# Patient Record
Sex: MALE | URBAN - METROPOLITAN AREA
[De-identification: names, ages, dates, MRNs, and addresses within clinical notes are randomized per-mention and may not be internally consistent; named-entity substitution may affect disease eponyms.]

---

## 2018-01-17 ENCOUNTER — HOSPITAL ENCOUNTER (OUTPATIENT)
Dept: ORTHOPEDIC SURGERY | Age: 73
Discharge: HOME | End: 2018-01-17
Payer: MEDICARE

## 2018-01-17 DIAGNOSIS — M25.551 PAIN IN JOINT INVOLVING RIGHT PELVIC REGION AND THIGH: ICD-10-CM

## 2018-01-17 PROCEDURE — 73502 X-RAY EXAM HIP UNI 2-3 VIEWS: CPT

## 2023-05-24 LAB
ALANINE AMINOTRANSFERASE (SGPT) (U/L) IN SER/PLAS: 14 U/L (ref 10–52)
ALBUMIN (G/DL) IN SER/PLAS: 4.3 G/DL (ref 3.4–5)
ALKALINE PHOSPHATASE (U/L) IN SER/PLAS: 43 U/L (ref 33–136)
ANION GAP IN SER/PLAS: 10 MMOL/L (ref 10–20)
ASPARTATE AMINOTRANSFERASE (SGOT) (U/L) IN SER/PLAS: 16 U/L (ref 9–39)
BILIRUBIN TOTAL (MG/DL) IN SER/PLAS: 0.8 MG/DL (ref 0–1.2)
CALCIUM (MG/DL) IN SER/PLAS: 9 MG/DL (ref 8.6–10.3)
CARBON DIOXIDE, TOTAL (MMOL/L) IN SER/PLAS: 25 MMOL/L (ref 21–32)
CHLORIDE (MMOL/L) IN SER/PLAS: 106 MMOL/L (ref 98–107)
CREATININE (MG/DL) IN SER/PLAS: 0.9 MG/DL (ref 0.5–1.3)
ESTIMATED AVERAGE GLUCOSE FOR HBA1C: 166 MG/DL
GFR MALE: 88 ML/MIN/1.73M2
GLUCOSE (MG/DL) IN SER/PLAS: 104 MG/DL (ref 74–99)
HEMOGLOBIN A1C/HEMOGLOBIN TOTAL IN BLOOD: 7.4 %
POTASSIUM (MMOL/L) IN SER/PLAS: 4.4 MMOL/L (ref 3.5–5.3)
PROSTATE SPECIFIC AG (NG/ML) IN SER/PLAS: 1.79 NG/ML (ref 0–4)
PROTEIN TOTAL: 7.5 G/DL (ref 6.4–8.2)
SODIUM (MMOL/L) IN SER/PLAS: 137 MMOL/L (ref 136–145)
UREA NITROGEN (MG/DL) IN SER/PLAS: 19 MG/DL (ref 6–23)

## 2023-08-23 PROBLEM — R39.9 LOWER URINARY TRACT SYMPTOMS (LUTS): Status: ACTIVE | Noted: 2023-08-23

## 2023-08-23 PROBLEM — R07.89 ATYPICAL CHEST PAIN: Status: ACTIVE | Noted: 2023-08-23

## 2023-08-23 PROBLEM — Z95.0 CARDIAC PACEMAKER IN SITU: Status: ACTIVE | Noted: 2023-08-23

## 2023-08-23 PROBLEM — M65.30 TRIGGER FINGER: Status: ACTIVE | Noted: 2023-08-23

## 2023-08-23 PROBLEM — M19.049 OSTEOARTHRITIS, HAND: Status: ACTIVE | Noted: 2023-08-23

## 2023-08-23 PROBLEM — M25.50 ARTHRALGIA: Status: ACTIVE | Noted: 2023-08-23

## 2023-08-23 PROBLEM — I21.4 NON-ST ELEVATED MYOCARDIAL INFARCTION (MULTI): Status: ACTIVE | Noted: 2023-08-23

## 2023-08-23 PROBLEM — I25.10 CAD S/P PERCUTANEOUS CORONARY ANGIOPLASTY: Status: ACTIVE | Noted: 2023-08-23

## 2023-08-23 PROBLEM — I10 BENIGN ESSENTIAL HYPERTENSION: Status: ACTIVE | Noted: 2023-08-23

## 2023-08-23 PROBLEM — F32.A DEPRESSION: Status: ACTIVE | Noted: 2023-08-23

## 2023-08-23 PROBLEM — M89.9 LESION OF VERTEBRA: Status: ACTIVE | Noted: 2023-08-23

## 2023-08-23 PROBLEM — I87.309 CHRONIC PERIPHERAL VENOUS HYPERTENSION: Status: ACTIVE | Noted: 2023-08-23

## 2023-08-23 PROBLEM — R91.1 PULMONARY NODULE: Status: ACTIVE | Noted: 2023-08-23

## 2023-08-23 PROBLEM — M25.519 SHOULDER PAIN: Status: ACTIVE | Noted: 2023-08-23

## 2023-08-23 PROBLEM — M19.90 DJD (DEGENERATIVE JOINT DISEASE): Status: ACTIVE | Noted: 2023-08-23

## 2023-08-23 PROBLEM — E78.2 HYPERLIPIDEMIA, MIXED: Status: ACTIVE | Noted: 2023-08-23

## 2023-08-23 PROBLEM — Z98.61 CAD S/P PERCUTANEOUS CORONARY ANGIOPLASTY: Status: ACTIVE | Noted: 2023-08-23

## 2023-08-23 PROBLEM — M75.111 NONTRAUMATIC INCOMPLETE TEAR OF RIGHT ROTATOR CUFF: Status: ACTIVE | Noted: 2023-08-23

## 2023-08-23 PROBLEM — N47.1 PHIMOSIS: Status: ACTIVE | Noted: 2023-08-23

## 2023-08-23 PROBLEM — I48.91 FIBRILLATION, ATRIAL (MULTI): Status: ACTIVE | Noted: 2023-08-23

## 2023-08-23 PROBLEM — M54.16 LUMBAR RADICULOPATHY, CHRONIC: Status: ACTIVE | Noted: 2023-08-23

## 2023-08-23 PROBLEM — R97.20 RISING PSA LEVEL: Status: ACTIVE | Noted: 2023-08-23

## 2023-08-23 PROBLEM — H93.13 TINNITUS OF BOTH EARS: Status: ACTIVE | Noted: 2023-08-23

## 2023-08-23 PROBLEM — I25.5 ISCHEMIC CARDIOMYOPATHY: Status: ACTIVE | Noted: 2023-08-23

## 2023-08-23 PROBLEM — F41.9 ANXIETY: Status: ACTIVE | Noted: 2023-08-23

## 2023-08-23 RX ORDER — EMPAGLIFLOZIN 10 MG/1
1 TABLET, FILM COATED ORAL DAILY
COMMUNITY
Start: 2022-01-03 | End: 2023-10-03 | Stop reason: ALTCHOICE

## 2023-08-23 RX ORDER — LISINOPRIL 10 MG/1
1 TABLET ORAL 2 TIMES DAILY
COMMUNITY
Start: 2021-10-25 | End: 2023-10-12

## 2023-08-23 RX ORDER — EVOLOCUMAB 140 MG/ML
140 INJECTION, SOLUTION SUBCUTANEOUS
COMMUNITY
Start: 2022-01-26 | End: 2023-11-28 | Stop reason: SDUPTHER

## 2023-08-23 RX ORDER — ISOSORBIDE MONONITRATE 60 MG/1
1 TABLET, EXTENDED RELEASE ORAL DAILY
COMMUNITY
Start: 2021-05-09

## 2023-08-23 RX ORDER — CLOPIDOGREL BISULFATE 75 MG/1
1 TABLET ORAL DAILY
COMMUNITY
End: 2023-12-29

## 2023-08-23 RX ORDER — EMPAGLIFLOZIN 25 MG/1
1 TABLET, FILM COATED ORAL DAILY
COMMUNITY
Start: 2023-02-01

## 2023-08-23 RX ORDER — ACETAMINOPHEN 500 MG
1 TABLET ORAL DAILY
COMMUNITY

## 2023-08-23 RX ORDER — NITROGLYCERIN 0.4 MG/1
TABLET SUBLINGUAL EVERY 5 MIN PRN
COMMUNITY
End: 2023-12-18 | Stop reason: SDUPTHER

## 2023-08-23 RX ORDER — ASPIRIN 81 MG/1
2 TABLET ORAL DAILY
COMMUNITY

## 2023-08-23 RX ORDER — BLOOD SUGAR DIAGNOSTIC
1 STRIP MISCELLANEOUS DAILY
COMMUNITY
End: 2024-02-07 | Stop reason: SDUPTHER

## 2023-08-23 RX ORDER — AMOXICILLIN 875 MG/1
TABLET, FILM COATED ORAL
COMMUNITY
Start: 2023-01-10 | End: 2023-10-03 | Stop reason: ALTCHOICE

## 2023-08-23 RX ORDER — METOPROLOL TARTRATE 25 MG/1
1 TABLET, FILM COATED ORAL 2 TIMES DAILY
COMMUNITY
Start: 2021-12-07 | End: 2023-11-09

## 2023-09-28 LAB
ALANINE AMINOTRANSFERASE (SGPT) (U/L) IN SER/PLAS: 14 U/L (ref 10–52)
ALBUMIN (G/DL) IN SER/PLAS: 4.3 G/DL (ref 3.4–5)
ALKALINE PHOSPHATASE (U/L) IN SER/PLAS: 45 U/L (ref 33–136)
ANION GAP IN SER/PLAS: 11 MMOL/L (ref 10–20)
ASPARTATE AMINOTRANSFERASE (SGOT) (U/L) IN SER/PLAS: 17 U/L (ref 9–39)
BILIRUBIN TOTAL (MG/DL) IN SER/PLAS: 0.8 MG/DL (ref 0–1.2)
CALCIUM (MG/DL) IN SER/PLAS: 9.7 MG/DL (ref 8.6–10.3)
CARBON DIOXIDE, TOTAL (MMOL/L) IN SER/PLAS: 27 MMOL/L (ref 21–32)
CHLORIDE (MMOL/L) IN SER/PLAS: 104 MMOL/L (ref 98–107)
CREATININE (MG/DL) IN SER/PLAS: 0.95 MG/DL (ref 0.5–1.3)
ERYTHROCYTE DISTRIBUTION WIDTH (RATIO) BY AUTOMATED COUNT: 13.1 % (ref 11.5–14.5)
ERYTHROCYTE MEAN CORPUSCULAR HEMOGLOBIN CONCENTRATION (G/DL) BY AUTOMATED: 32.8 G/DL (ref 32–36)
ERYTHROCYTE MEAN CORPUSCULAR VOLUME (FL) BY AUTOMATED COUNT: 94 FL (ref 80–100)
ERYTHROCYTES (10*6/UL) IN BLOOD BY AUTOMATED COUNT: 5.08 X10E12/L (ref 4.5–5.9)
ESTIMATED AVERAGE GLUCOSE FOR HBA1C: 157 MG/DL
GFR MALE: 82 ML/MIN/1.73M2
GLUCOSE (MG/DL) IN SER/PLAS: 96 MG/DL (ref 74–99)
HEMATOCRIT (%) IN BLOOD BY AUTOMATED COUNT: 47.6 % (ref 41–52)
HEMOGLOBIN (G/DL) IN BLOOD: 15.6 G/DL (ref 13.5–17.5)
HEMOGLOBIN A1C/HEMOGLOBIN TOTAL IN BLOOD: 7.1 %
LEUKOCYTES (10*3/UL) IN BLOOD BY AUTOMATED COUNT: 6.2 X10E9/L (ref 4.4–11.3)
PLATELETS (10*3/UL) IN BLOOD AUTOMATED COUNT: 216 X10E9/L (ref 150–450)
POTASSIUM (MMOL/L) IN SER/PLAS: 4.7 MMOL/L (ref 3.5–5.3)
PROTEIN TOTAL: 7.6 G/DL (ref 6.4–8.2)
SODIUM (MMOL/L) IN SER/PLAS: 137 MMOL/L (ref 136–145)
UREA NITROGEN (MG/DL) IN SER/PLAS: 22 MG/DL (ref 6–23)

## 2023-10-03 ENCOUNTER — OFFICE VISIT (OUTPATIENT)
Dept: PRIMARY CARE | Facility: CLINIC | Age: 78
End: 2023-10-03
Payer: MEDICARE

## 2023-10-03 VITALS
TEMPERATURE: 97.3 F | BODY MASS INDEX: 33.24 KG/M2 | HEART RATE: 65 BPM | HEIGHT: 70 IN | DIASTOLIC BLOOD PRESSURE: 57 MMHG | WEIGHT: 232.2 LBS | SYSTOLIC BLOOD PRESSURE: 94 MMHG

## 2023-10-03 DIAGNOSIS — E13.9 DIABETES 1.5, MANAGED AS TYPE 2 (MULTI): Primary | ICD-10-CM

## 2023-10-03 DIAGNOSIS — I25.10 CAD S/P PERCUTANEOUS CORONARY ANGIOPLASTY: ICD-10-CM

## 2023-10-03 DIAGNOSIS — Z98.61 CAD S/P PERCUTANEOUS CORONARY ANGIOPLASTY: ICD-10-CM

## 2023-10-03 DIAGNOSIS — E78.2 HYPERLIPIDEMIA, MIXED: ICD-10-CM

## 2023-10-03 PROCEDURE — 1159F MED LIST DOCD IN RCRD: CPT | Performed by: FAMILY MEDICINE

## 2023-10-03 PROCEDURE — 3078F DIAST BP <80 MM HG: CPT | Performed by: FAMILY MEDICINE

## 2023-10-03 PROCEDURE — 3074F SYST BP LT 130 MM HG: CPT | Performed by: FAMILY MEDICINE

## 2023-10-03 PROCEDURE — 1036F TOBACCO NON-USER: CPT | Performed by: FAMILY MEDICINE

## 2023-10-03 PROCEDURE — 99213 OFFICE O/P EST LOW 20 MIN: CPT | Performed by: FAMILY MEDICINE

## 2023-10-03 RX ORDER — LANCETS 30 GAUGE
EACH MISCELLANEOUS
COMMUNITY
Start: 2023-01-30

## 2023-10-03 ASSESSMENT — PATIENT HEALTH QUESTIONNAIRE - PHQ9
SUM OF ALL RESPONSES TO PHQ9 QUESTIONS 1 AND 2: 2
2. FEELING DOWN, DEPRESSED OR HOPELESS: SEVERAL DAYS
1. LITTLE INTEREST OR PLEASURE IN DOING THINGS: SEVERAL DAYS

## 2023-10-05 ASSESSMENT — ENCOUNTER SYMPTOMS
CONSTITUTIONAL NEGATIVE: 1
PSYCHIATRIC NEGATIVE: 1
RESPIRATORY NEGATIVE: 1
GASTROINTESTINAL NEGATIVE: 1
CARDIOVASCULAR NEGATIVE: 1
MUSCULOSKELETAL NEGATIVE: 1
ALLERGIC/IMMUNOLOGIC NEGATIVE: 1
HEMATOLOGIC/LYMPHATIC NEGATIVE: 1
ENDOCRINE NEGATIVE: 1
EYES NEGATIVE: 1

## 2023-10-05 NOTE — PROGRESS NOTES
Subjective the patient is here today for a follow-up on his diabetes and hypertension.  He states that he is overall feeling well.  He continues on his meds noted.  His blood sugars in the morning are ranging between 100-110.  He has no other complaints at the present time.  He does have chronic tinn[facility] that [facility]  He sees his cardiologist on a regular basis.  Past medical and social histories noted    Patient ID: Juan Loza is a 78 y.o. male who presents for Follow-up (Routine 4 month follow up:  wants flu vac):    Problem List Items Addressed This Visit    None     Past Medical History:   Diagnosis Date    Encounter for immunization 11/24/2021    Encounter for immunization    Ingrowing nail 03/03/2022    Ingrown toenail    Obesity, unspecified 05/02/2022    Class 2 obesity with body mass index (BMI) of 36.0 to 36.9 in adult    Obesity, unspecified 10/17/2022    Class 2 obesity with body mass index (BMI) of 37.0 to 37.9 in adult    Obesity, unspecified 07/27/2022    Class 2 obesity with body mass index (BMI) of 35.0 to 35.9 in adult    Obesity, unspecified 04/12/2022    Class 2 obesity with body mass index (BMI) of 35.0 to 35.9 in adult    Other acute postprocedural pain 05/09/2022    Post-op pain    Other conditions influencing health status 11/14/2020    Postoperative bleeding from mouth    Personal history of other diseases of the digestive system 11/14/2020    History of oral hemorrhage    Personal history of other diseases of the musculoskeletal system and connective tissue 06/15/2022    History of neck pain    Personal history of other specified conditions 05/02/2022    History of fatigue    Personal history of other specified conditions 05/02/2022    History of abdominal pain    Stiffness of right hand, not elsewhere classified 07/06/2022    Stiffness of finger joint of right hand      Past Surgical History:   Procedure Laterality Date    OTHER SURGICAL HISTORY   11/24/2021    Hip replacement    OTHER SURGICAL HISTORY  11/24/2021    Wrist surgery    OTHER SURGICAL HISTORY  11/24/2021    Percutaneous transluminal coronary angioplasty    OTHER SURGICAL HISTORY  11/24/2021    Pacemaker insertion    OTHER SURGICAL HISTORY  11/24/2021    Appendectomy    OTHER SURGICAL HISTORY  11/24/2021    Back surgery    OTHER SURGICAL HISTORY  11/24/2021    Esophagogastroduodenoscopy    OTHER SURGICAL HISTORY  11/24/2021    Tonsillectomy    OTHER SURGICAL HISTORY  11/24/2021    Varicose vein sclerotherapy    OTHER SURGICAL HISTORY  12/09/2021    Complete colonoscopy    OTHER SURGICAL HISTORY  12/15/2021    Cataract surgery    OTHER SURGICAL HISTORY  06/29/2022    Circumcision      Family History   Problem Relation Name Age of Onset    Arthritis Mother      Hypertension Father      Stroke Father      Hypertension Brother      Esophageal cancer Son        Social History     Socioeconomic History    Marital status:      Spouse name: Not on file    Number of children: Not on file    Years of education: Not on file    Highest education level: Not on file   Occupational History    Not on file   Tobacco Use    Smoking status: Never    Smokeless tobacco: Never   Substance and Sexual Activity    Alcohol use: Not Currently    Drug use: Never    Sexual activity: Not on file   Other Topics Concern    Not on file   Social History Narrative    Not on file     Social Determinants of Health     Financial Resource Strain: Not on file   Food Insecurity: Not on file   Transportation Needs: Not on file   Physical Activity: Not on file   Stress: Not on file   Social Connections: Not on file   Intimate Partner Violence: Not on file   Housing Stability: Not on file      Metformin, Statins-hmg-coa reductase inhibitors, and Glimepiride   Current Outpatient Medications   Medication Sig Dispense Refill    OneTouch Ultra2 Meter misc TEST ONCE A DAY      aspirin 81 mg EC tablet Take 2 tablets (162 mg) by mouth once  daily.      cholecalciferol (Vitamin D3) 50 mcg (2,000 unit) capsule Take 1 capsule (50 mcg) by mouth once daily.      clopidogrel (Plavix) 75 mg tablet Take 1 tablet (75 mg) by mouth once daily.      isosorbide mononitrate ER (Imdur) 60 mg 24 hr tablet Take 1 tablet (60 mg) by mouth once daily.      Jardiance 25 mg       lisinopril 10 mg tablet Take 1 tablet (10 mg) by mouth 2 times a day.      metoprolol tartrate (Lopressor) 25 mg tablet Take 1 tablet (25 mg) by mouth 2 times a day.      multivit-min/ferrous fumarate (MULTI VITAMIN ORAL) Take 1 tablet by mouth once daily.      nitroglycerin (Nitrostat) 0.4 mg SL tablet Place under the tongue every 5 minutes if needed for chest pain (FOR UP TO 3 DOSES AS NEEDED FOR CHEST PAIN.).      OneTouch Ultra Test strip 1 strip once daily.      Repatha SureClick 140 mg/mL injection Inject 1 mL (140 mg) under the skin every 14 (fourteen) days.       No current facility-administered medications for this visit.       Immunization History   Administered Date(s) Administered    Influenza, High Dose Seasonal, Preservative Free 10/12/2015, 10/24/2017, 11/16/2018, 10/01/2019, 09/16/2020, 10/19/2021, 09/28/2022    Influenza, Unspecified 10/01/2014, 11/01/2015, 09/16/2016    Influenza, seasonal, injectable 09/23/2014    Influenza, seasonal, injectable, preservative free 11/26/2012    Moderna SARS-CoV-2 Vaccination 02/17/2021, 03/17/2021, 10/25/2021    Pneumococcal conjugate vaccine, 13-valent (PREVNAR 13) 11/30/2015, 10/28/2016    Pneumococcal polysaccharide vaccine, 23-valent, age 2 years and older (PNEUMOVAX 23) 11/17/2015    Zoster, Unspecified 08/31/2020, 11/16/2020    Zoster, live 01/01/2013        Review of Systems   Constitutional: Negative.    HENT: Negative.     Eyes: Negative.    Respiratory: Negative.     Cardiovascular: Negative.    Gastrointestinal: Negative.    Endocrine: Negative.    Genitourinary: Negative.    Musculoskeletal: Negative.    Skin: Negative.     Allergic/Immunologic: Negative.    Hematological: Negative.    Psychiatric/Behavioral: Negative.     All other systems reviewed and are negative.       Vitals:    10/03/23 1140   BP: 94/57   Pulse: 65   Temp: 36.3 °C (97.3 °F)       Physical Exam  Constitutional:       Appearance: Normal appearance.   HENT:      Right Ear: Tympanic membrane and ear canal normal.      Left Ear: Tympanic membrane and ear canal normal.   Neck:      Vascular: No carotid bruit.   Cardiovascular:      Rate and Rhythm: Normal rate and regular rhythm.      Pulses: Normal pulses.      Heart sounds: Normal heart sounds.   Pulmonary:      Effort: Pulmonary effort is normal.      Breath sounds: Normal breath sounds.   Musculoskeletal:      Cervical back: Neck supple.   Neurological:      Mental Status: He is alert.   Psychiatric:         Mood and Affect: Mood normal.         Thought Content: Thought content normal.          ASSESSMENT/PLAN:  Diabetes type 2 improved with A1c 7.1  Hyperlipidemia  Hypertension stable  Coronary artery disease followed by cardiology    Continue current medications noted  Continue to follow diabetic diet and exercise regularly  Follow-up with cardiology as scheduled  Eye examinations up-to-date  Colonoscopy up-to-date  Check CMP A1c and lipid profile in 4 months  Follow-up in 4 months and call as needed

## 2023-10-10 DIAGNOSIS — I10 BENIGN ESSENTIAL HYPERTENSION: ICD-10-CM

## 2023-10-12 RX ORDER — LISINOPRIL 10 MG/1
10 TABLET ORAL 2 TIMES DAILY
Qty: 180 TABLET | Refills: 0 | Status: SHIPPED | OUTPATIENT
Start: 2023-10-12 | End: 2024-01-17

## 2023-10-23 ENCOUNTER — HOSPITAL ENCOUNTER (OUTPATIENT)
Dept: CARDIOLOGY | Facility: HOSPITAL | Age: 78
Discharge: HOME | End: 2023-10-23
Payer: MEDICARE

## 2023-10-23 DIAGNOSIS — Z95.0 PACEMAKER: Primary | ICD-10-CM

## 2023-10-23 DIAGNOSIS — I49.5 SICK SINUS SYNDROME (MULTI): ICD-10-CM

## 2023-10-23 DIAGNOSIS — Z95.0 PACEMAKER: ICD-10-CM

## 2023-10-23 PROCEDURE — 93294 REM INTERROG EVL PM/LDLS PM: CPT | Performed by: INTERNAL MEDICINE

## 2023-10-23 PROCEDURE — 93296 REM INTERROG EVL PM/IDS: CPT

## 2023-11-07 DIAGNOSIS — I10 BENIGN ESSENTIAL HYPERTENSION: ICD-10-CM

## 2023-11-09 RX ORDER — METOPROLOL TARTRATE 25 MG/1
25 TABLET, FILM COATED ORAL 2 TIMES DAILY
Qty: 180 TABLET | Refills: 3 | Status: SHIPPED | OUTPATIENT
Start: 2023-11-09

## 2023-11-28 ENCOUNTER — TELEPHONE (OUTPATIENT)
Dept: CARDIOLOGY | Facility: CLINIC | Age: 78
End: 2023-11-28
Payer: MEDICARE

## 2023-11-28 DIAGNOSIS — E78.2 HYPERLIPIDEMIA, MIXED: ICD-10-CM

## 2023-11-28 RX ORDER — EVOLOCUMAB 140 MG/ML
140 INJECTION, SOLUTION SUBCUTANEOUS
Qty: 12 EACH | Refills: 2 | Status: SHIPPED | OUTPATIENT
Start: 2023-11-28 | End: 2024-01-25

## 2023-11-30 ENCOUNTER — TELEPHONE (OUTPATIENT)
Dept: CARDIOLOGY | Facility: CLINIC | Age: 78
End: 2023-11-30
Payer: MEDICARE

## 2023-11-30 NOTE — TELEPHONE ENCOUNTER
Pt called office stating that he is concerned about his Repatha.  He states that now that Samreen Huynh has retired who is going to take care of him getting the cailin through [facility] to get his Repatha.  Per pt he can not afford to pay $500/month. Per pt Ray use to take care of this for him.  He is asking that someone try to get him the cailin so he can get his medications.  Pt states he has a couple of weeks before he needs his medication.   Per pt he will be seeing Dr. Neil on 12/18/23 and will have a long discussion with him as he is concerned about what if Dr. Neil retires.     Routed to Manisha LE RN

## 2023-12-01 NOTE — TELEPHONE ENCOUNTER
[facility] number and Patient Advocate Number. He will try to get Repatha help with cost. Patient noted to Dr Neil that he los his wife in the summer and if cost is too much he will not take.

## 2023-12-18 ENCOUNTER — OFFICE VISIT (OUTPATIENT)
Dept: CARDIOLOGY | Facility: CLINIC | Age: 78
End: 2023-12-18
Payer: MEDICARE

## 2023-12-18 VITALS
HEIGHT: 70 IN | SYSTOLIC BLOOD PRESSURE: 124 MMHG | HEART RATE: 68 BPM | WEIGHT: 235 LBS | BODY MASS INDEX: 33.64 KG/M2 | DIASTOLIC BLOOD PRESSURE: 78 MMHG

## 2023-12-18 DIAGNOSIS — I87.309 CHRONIC PERIPHERAL VENOUS HYPERTENSION: ICD-10-CM

## 2023-12-18 DIAGNOSIS — I21.4 NON-ST ELEVATED MYOCARDIAL INFARCTION (MULTI): ICD-10-CM

## 2023-12-18 DIAGNOSIS — I10 BENIGN ESSENTIAL HYPERTENSION: ICD-10-CM

## 2023-12-18 DIAGNOSIS — E78.2 HYPERLIPIDEMIA, MIXED: ICD-10-CM

## 2023-12-18 DIAGNOSIS — Z78.9 NEVER SMOKED CIGARETTES: ICD-10-CM

## 2023-12-18 DIAGNOSIS — I25.10 CAD S/P PERCUTANEOUS CORONARY ANGIOPLASTY: ICD-10-CM

## 2023-12-18 DIAGNOSIS — I25.5 ISCHEMIC CARDIOMYOPATHY: ICD-10-CM

## 2023-12-18 DIAGNOSIS — Z95.0 CARDIAC PACEMAKER IN SITU: ICD-10-CM

## 2023-12-18 DIAGNOSIS — I48.91 ATRIAL FIBRILLATION, UNSPECIFIED TYPE (MULTI): ICD-10-CM

## 2023-12-18 DIAGNOSIS — Z98.61 CAD S/P PERCUTANEOUS CORONARY ANGIOPLASTY: ICD-10-CM

## 2023-12-18 PROCEDURE — 1036F TOBACCO NON-USER: CPT | Performed by: INTERNAL MEDICINE

## 2023-12-18 PROCEDURE — 99214 OFFICE O/P EST MOD 30 MIN: CPT | Performed by: INTERNAL MEDICINE

## 2023-12-18 PROCEDURE — 1159F MED LIST DOCD IN RCRD: CPT | Performed by: INTERNAL MEDICINE

## 2023-12-18 PROCEDURE — 3074F SYST BP LT 130 MM HG: CPT | Performed by: INTERNAL MEDICINE

## 2023-12-18 PROCEDURE — 3078F DIAST BP <80 MM HG: CPT | Performed by: INTERNAL MEDICINE

## 2023-12-18 RX ORDER — NITROGLYCERIN 0.4 MG/1
0.4 TABLET SUBLINGUAL EVERY 5 MIN PRN
Qty: 25 TABLET | Refills: 5 | Status: SHIPPED | OUTPATIENT
Start: 2023-12-18

## 2023-12-18 NOTE — PROGRESS NOTES
Patient:  Juan Loza  YOB: 1945  MRN: [id number]       HPI:       Juan Loza is a 78 y.o. male who returns today for cardiac follow-up.  Has coronary heart disease.  He unfortunately lost his wife this past summer when she passed away in his sleep unexpectedly.  He is still grieving.  He denies any new unusual cardiac symptoms.  We had a lengthy conversation pertaining to this.      Objective:     Vitals:    12/18/23 1226   BP: 124/78   Pulse: 68       Wt Readings from Last 4 Encounters:   12/18/23 107 kg (235 lb)   10/03/23 105 kg (232 lb 3.2 oz)   05/31/23 112 kg (246 lb)   04/17/23 114 kg (251 lb)       Allergies:     Allergies   Allergen Reactions    Metformin Other    Statins-Hmg-Coa Reductase Inhibitors Other    Glimepiride Other, Diarrhea and Palpitations        Medications:     Current Outpatient Medications   Medication Instructions    aspirin 81 mg EC tablet 2 tablets, oral, Daily    cholecalciferol (Vitamin D3) 50 mcg (2,000 unit) capsule 1 capsule, oral, Daily    clopidogrel (Plavix) 75 mg tablet 1 tablet, oral, Daily    isosorbide mononitrate ER (Imdur) 60 mg 24 hr tablet 1 tablet, oral, Daily    Jardiance 25 mg     lisinopril 10 mg, oral, 2 times daily    metoprolol tartrate (LOPRESSOR) 25 mg, oral, 2 times daily    multivit-min/ferrous fumarate (MULTI VITAMIN ORAL) 1 tablet, oral, Daily    nitroglycerin (Nitrostat) 0.4 mg SL tablet sublingual, Every 5 min PRN    OneTouch Ultra Test strip 1 strip, Daily    OneTouch Ultra2 Meter misc TEST ONCE A DAY    Repatha SureClick 140 mg, subcutaneous, Every 14 days       Physical Examination:     Constitutional:       Appearance: Healthy appearance. Not in distress.   Neck:      Vascular: No JVR. JVD normal.   Pulmonary:      Effort: Pulmonary effort is normal.      Breath sounds: Normal breath sounds. No wheezing. No rhonchi. No rales.   Chest:      Chest wall: Not tender to palpatation.   Cardiovascular:      PMI at left midclavicular line.  "Normal rate. Regular rhythm. Normal S1. Normal S2.       Murmurs: There is no murmur.      No gallop.  No click. No rub.   Pulses:     Intact distal pulses.   Edema:     Peripheral edema absent.   Abdominal:      General: Bowel sounds are normal.      Palpations: Abdomen is soft.      Tenderness: There is no abdominal tenderness.   Musculoskeletal: Normal range of motion.         General: No tenderness. Skin:     General: Skin is warm and dry.   Neurological:      General: No focal deficit present.      Mental Status: Alert and oriented to person, place and time.          Lab:     CBC:   Lab Results   Component Value Date    WBC 6.2 09/28/2023    RBC 5.08 09/28/2023    HGB 15.6 09/28/2023    HCT 47.6 09/28/2023     09/28/2023        CMP:    Lab Results   Component Value Date     09/28/2023    K 4.7 09/28/2023     09/28/2023    CO2 27 09/28/2023    BUN 22 09/28/2023    CREATININE 0.95 09/28/2023    GLUCOSE 96 09/28/2023    CALCIUM 9.7 09/28/2023       Magnesium:    No results found for: \"MG\"    Lipid Profile:    Lab Results   Component Value Date    TRIG 237 (H) 01/25/2023    HDL 47.8 01/25/2023       TSH:    Lab Results   Component Value Date    TSH 0.45 05/02/2022       BNP:   No results found for: \"BNP\"     PT/INR:    No results found for: \"PROTIME\", \"INR\"    HgBA1c:    Lab Results   Component Value Date    HGBA1C 7.1 (A) 09/28/2023       BMP:  Lab Results   Component Value Date     09/28/2023     05/24/2023     01/25/2023    K 4.7 09/28/2023    K 4.4 05/24/2023    K 4.5 01/25/2023     09/28/2023     05/24/2023     01/25/2023    CO2 27 09/28/2023    CO2 25 05/24/2023    CO2 25 01/25/2023    BUN 22 09/28/2023    BUN 19 05/24/2023    BUN 19 01/25/2023    CREATININE 0.95 09/28/2023    CREATININE 0.90 05/24/2023    CREATININE 0.87 01/25/2023       CBC:  Lab Results   Component Value Date    WBC 6.2 09/28/2023    WBC 6.2 05/02/2022    WBC 7.0 04/07/2022    RBC 5.08 " "09/28/2023    RBC 5.09 05/02/2022    RBC 5.27 04/07/2022    HGB 15.6 09/28/2023    HGB 15.7 05/02/2022    HGB 16.2 04/07/2022    HCT 47.6 09/28/2023    HCT 49.5 05/02/2022    HCT 50.5 04/07/2022    MCV 94 09/28/2023    MCV 97 05/02/2022    MCV 96 04/07/2022    MCHC 32.8 09/28/2023    MCHC 31.7 (L) 05/02/2022    MCHC 32.1 04/07/2022    RDW 13.1 09/28/2023    RDW 12.9 05/02/2022    RDW 13.8 04/07/2022     09/28/2023     05/02/2022     04/07/2022       Cardiac Enzymes:    No results found for: \"TROPHS\"    Hepatic Function Panel:    Lab Results   Component Value Date    ALKPHOS 45 09/28/2023    ALT 14 09/28/2023    AST 17 09/28/2023    PROT 7.6 09/28/2023    BILITOT 0.8 09/28/2023       Diagnostic Studies:              [address], [address] [place] [address]           Tel [phone] Fax [phone]     TRANSTHORACIC ECHOCARDIOGRAM REPORT        Patient Name:     NICK ERNA Martines Physician: [id number] Mikel Arredondo MD,                    [facility]  Study Date:       10/10/2022   Referring          [id number] CARLTON JONES                                 Physician:  MRN/PID:          [id number]     PCP:               [id number] Donnie Farnsworth MD  Accession/Order#: [id number] Department         [facility]                                 Location:          [place]  YOB: 1945     Fellow:  Gender:           M            Nurse:  Admit Date:       10/10/2022   Sonographer:       Carlton Mendoza  Admission Status: Outpatient   Additional Staff:  Height:           177.80 cm    CC Report to:  Weight:           115.67 kg    Study Type:        Echocardiogram  BSA:              2.31 m2  Blood Pressure: 120 /70 mmHg     Diagnosis/ICD: I25.10-Atherosclerotic heart disease of native coronary artery                 without angina pectoris; Z98.61-Coronary angioplasty status                 (PTCA); I21.4-Non ST elevation " (NSTEMI) myocardial infarction  Indication:    CAD, PTCA, NSTEMI  Procedure/CPT: Echo Complete w Full [id number]     Patient History:  Diabetes:          Yes  Pacer/Defib:       Permanent pacemaker  Pertinent History: A-Fib, CAD, Chest Pain, HTN, Hyperlipidemia and PTCA, NSTEMI,                     ICM, Old MI.     Study Detail: The following Echo studies were performed: 2D, M-Mode, Doppler and                color flow. The patient was awake.        PHYSICIAN INTERPRETATION:  Left Ventricle: Left ventricular systolic function is low normal, with an estimated ejection fraction of 50-55%. There are no regional wall motion abnormalities. The left ventricular cavity size is normal. The left ventricular septal wall thickness is mildly increased. There is mildly increased left ventricular posterior wall thickness. Spectral Doppler shows an impaired relaxation pattern of left ventricular diastolic filling.  Left Atrium: The left atrium is normal in size.  Right Ventricle: The right ventricle is mildly enlarged. There is mildly reduced right ventricular systolic function. Left atrial volume index 31.2 mL/m2.  pacemaker device noted in the right atrium and right ventricle.  Right Atrium: The right atrium is mildly dilated.  Aortic Valve: The aortic valve appears structurally normal. There is no evidence of aortic valve regurgitation. The peak instantaneous gradient of the aortic valve is 7.6 mmHg. The mean gradient of the aortic valve is 5.0 mmHg.  Mitral Valve: The mitral valve is mildly thickened. There is trace to mild mitral valve regurgitation. Trivial to 1+ mitral regurgitation.  Tricuspid Valve: The tricuspid valve is structurally normal. There is trace to mild tricuspid regurgitation. Trivial to 1+ tricuspid regurgitation with estimated RVSP 31 mmHg.  Pulmonic Valve: The pulmonic valve is structurally normal. There is trace pulmonic valve regurgitation.  Pericardium: There is a trivial pericardial effusion. There  is a pericardial fat pad present.  Aorta: The aortic root is normal. There is mild dilatation of the aortic root. Aortic root measures 3.9 cm and ascending aorta measures 3.7 cm.        CONCLUSIONS:   1. Left ventricular systolic function is low normal with a 50-55% estimated ejection fraction.   2. Spectral Doppler shows an impaired relaxation pattern of left ventricular diastolic filling.   3. Left atrial volume index 31.2 mL/m2.      pacemaker device noted in the right atrium and right ventricle.   4. There is mildly reduced right ventricular systolic function.   5. Trivial to 1+ mitral regurgitation.   6. Trivial to 1+ tricuspid regurgitation with estimated RVSP 31 mmHg.   7. Aortic root measures 3.9 cm and ascending aorta measures 3.7 cm.   8. Comparison study September 14, 2020 showed estimated LV ejection fraction 50 to 55%. Mild tricuspid regurgitation.       MRN: [id number]  Patient Name: NICK PORTILLO     STUDY:  MYOCARDIAL PERFUSION STRESS TEST WITH LEXISCAN     Performing facility:  [facility],  [address] [place] [address] [id number],  [address] Provider:  Michelle Garcia MD, [facility]  PCP:  Dr. ERWIN  Supervising provider:  Dick Melendez MD     INDICATION:  CAD;  S/P/PTCA;  ICM;  OLD MI     HISTORY:  Gender:  M; Age:  74 y/o ; Height:  180.34 cm; Weight:  617.8993245  kg.     High Cholesterol;  Diabetes;  HTN;  CAD;  Previous MI;  Family HX  CAD;  Arrhythmias;  PPM     Denies smoking.     Cardiac catheterization on 2015.  PTCA on 2015 CX.     COMPARISON:  Previous nuclear testing completed on 2018 at [facility].        ACCESSION NUMBER(S):  [id number]; [id number]; [id number]     ORDERING CLINICIAN:  MICHELLE GARCIA     TECHNIQUE:  ONE DAY protocol.  Stress injection: Date:9/14/220, 35.4 mCi of Myoview IV 20 seconds  after rapid injection of Lexiscan.  Rest injection: Date: 9/14/20020, 11.0 mCi of Myoview IV at rest.  The patient had a rapid injection of  0.4 mg of  Lexiscan IV over 10  seconds.  Imaging was performed by  gated tomographic technique.  Reason for Lexiscan:  PPM     STRESS TEST DATA:  Resting heart rate was 62 BPM.  Resting blood pressure was 162/100 mmHg.  Peak blood pressure was 162/100 mmHg.  Peak heart rate was 64 BPM.     TEST TERMINATED DUE TO:  Protocol completed     FINDINGS:  STRESS TEST RESULTS:     Resting electrocardiogram revealed sinus rhythm with first-degree AV  block.  There were no significant ischemic ECG changes or dysrhythmias.  The patient did not have chest pains/symptoms during procedure.  There was a normal recovery phase.     IMAGING RESULTS:     Image quality was good.  Rest and stress tomographic images were reviewed and revealed  abnormal perfusion.  There was no evidence of perfusion abnormalities of myocardial  ischemia.  There was evidence of perfusion abnormality with medium-sized area of  severely reduced perfusion uptake in the inferolateral wall extending  from the apex to the base, which is fixed on both rest and stress  images consistent with infarction.  There was no left ventricular dilatation with stress.  Overall left ventricular systolic function appeared to be mildly  abnormal.. There was mild global hypokinesis.  LVEF was 43%.  TID is 1.06 and is normal.  There was evidence of diaphragmatic attenuation artifact.     IMPRESSION:  Abnormal Lexiscan Myoview cardiac perfusion stress test.  No independent  myocardial ischemia by perfusion imaging.  Moderate infero lateral  myocardial infarction by perfusion imaging.  Abnormal left ventricular systolic function.  Left ventricular ejection fraction 43 %.  When compared to prior study from June 2018, the moderate  inferolateral infarct was previously described and appears to be  unchanged. The ejection fraction was not estimated on the prior study.  Non invasive risk stratification is intermediate risk..  Radiology:     No orders to display       Problem List:     Patient  Active Problem List   Diagnosis    Anxiety    Arthralgia    Atypical chest pain    Benign essential hypertension    CAD S/P percutaneous coronary angioplasty    Cardiac pacemaker in situ    Chronic peripheral venous hypertension    Depression    DJD (degenerative joint disease)    Fibrillation, atrial (CMS/HCC)    Hyperlipidemia, mixed    Ischemic cardiomyopathy    Lesion of vertebra    Lower urinary tract symptoms (LUTS)    Lumbar radiculopathy, chronic    Non-ST elevated myocardial infarction (CMS/HCC)    Nontraumatic incomplete tear of right rotator cuff    Osteoarthritis, hand    Phimosis    Pulmonary nodule    Rising PSA level    Shoulder pain    Tinnitus of both ears    Trigger finger    BMI 33.0-33.9,adult    Never smoked cigarettes       Asessment:   78-year-old gentleman seen and evaluated today in follow-up for cardiovascular disease.    Meds, vitals, examination as noted.    Chart review detail discussed the patient at length.    Impression:    ASHD class I-II  History of PCI and stenting  Sinus node dysfunction  Permanent pacemaker  Paroxysmal A-fib  Myalgias  Obesity  Dyslipidemia with statin intolerance.      Plan:   Recommendation:  Maintain current meds  See me in 6 months  Have advised that he understands and continue the grieving process and should he need any assistance in any way to notify us  Continue exercise and weight loss  Consider repeat cardiac studies after next visit

## 2023-12-18 NOTE — PATIENT INSTRUCTIONS
Continue same medications/treatment.  Patient educated on proper medication use.  Patient educated on risk factor modification.  Please bring any lab results from other providers/physicians to your next appointment.    Please bring all medicines, vitamins, and herbal supplements with you when you come to the office.    Prescriptions will not be filled unless you are compliant with your follow up appointments or have a follow up appointment scheduled as per instruction of your physician. Refills should be requested at the time of your visit.    Follow up in 6 months    I, LARA VENEGAS RN, AM SCRIBING FOR AND IN THE PRESENCE OF DR. PANTERA JONES, DO, FACC

## 2023-12-20 ENCOUNTER — OFFICE VISIT (OUTPATIENT)
Dept: CARDIOLOGY | Facility: CLINIC | Age: 78
End: 2023-12-20
Payer: MEDICARE

## 2023-12-20 ENCOUNTER — HOSPITAL ENCOUNTER (OUTPATIENT)
Dept: CARDIOLOGY | Facility: HOSPITAL | Age: 78
Discharge: HOME | End: 2023-12-20
Payer: MEDICARE

## 2023-12-20 VITALS
HEART RATE: 71 BPM | SYSTOLIC BLOOD PRESSURE: 134 MMHG | HEIGHT: 70 IN | WEIGHT: 235 LBS | DIASTOLIC BLOOD PRESSURE: 82 MMHG | BODY MASS INDEX: 33.64 KG/M2

## 2023-12-20 DIAGNOSIS — I49.5 SICK SINUS SYNDROME (MULTI): ICD-10-CM

## 2023-12-20 DIAGNOSIS — I49.5 SINUS NODE DYSFUNCTION (MULTI): ICD-10-CM

## 2023-12-20 DIAGNOSIS — I87.309 CHRONIC PERIPHERAL VENOUS HYPERTENSION: ICD-10-CM

## 2023-12-20 DIAGNOSIS — I48.91 ATRIAL FIBRILLATION, UNSPECIFIED TYPE (MULTI): ICD-10-CM

## 2023-12-20 DIAGNOSIS — Z95.0 PACEMAKER: ICD-10-CM

## 2023-12-20 DIAGNOSIS — Z78.9 NEVER SMOKED CIGARETTES: ICD-10-CM

## 2023-12-20 DIAGNOSIS — Z95.0 CARDIAC PACEMAKER IN SITU: Primary | ICD-10-CM

## 2023-12-20 PROBLEM — I10 HYPERTENSION: Status: ACTIVE | Noted: 2023-12-20

## 2023-12-20 PROBLEM — I25.10 CORONARY ARTERY DISEASE INVOLVING NATIVE CORONARY ARTERY OF NATIVE HEART WITHOUT ANGINA PECTORIS: Status: RESOLVED | Noted: 2021-08-04 | Resolved: 2023-12-20

## 2023-12-20 PROBLEM — I10 HYPERTENSION: Status: RESOLVED | Noted: 2023-12-20 | Resolved: 2023-12-20

## 2023-12-20 PROBLEM — I25.10 CORONARY ARTERY DISEASE INVOLVING NATIVE CORONARY ARTERY OF NATIVE HEART WITHOUT ANGINA PECTORIS: Status: ACTIVE | Noted: 2021-08-04

## 2023-12-20 PROBLEM — E11.9 DIABETES MELLITUS (MULTI): Status: ACTIVE | Noted: 2023-12-20

## 2023-12-20 PROCEDURE — 93280 PM DEVICE PROGR EVAL DUAL: CPT | Performed by: INTERNAL MEDICINE

## 2023-12-20 PROCEDURE — 99214 OFFICE O/P EST MOD 30 MIN: CPT | Performed by: INTERNAL MEDICINE

## 2023-12-20 PROCEDURE — 93280 PM DEVICE PROGR EVAL DUAL: CPT

## 2023-12-20 PROCEDURE — 3075F SYST BP GE 130 - 139MM HG: CPT | Performed by: INTERNAL MEDICINE

## 2023-12-20 PROCEDURE — 93000 ELECTROCARDIOGRAM COMPLETE: CPT | Performed by: INTERNAL MEDICINE

## 2023-12-20 PROCEDURE — 93290 INTERROG DEV EVAL ICPMS IP: CPT | Performed by: INTERNAL MEDICINE

## 2023-12-20 PROCEDURE — 1159F MED LIST DOCD IN RCRD: CPT | Performed by: INTERNAL MEDICINE

## 2023-12-20 PROCEDURE — 1036F TOBACCO NON-USER: CPT | Performed by: INTERNAL MEDICINE

## 2023-12-20 PROCEDURE — 3079F DIAST BP 80-89 MM HG: CPT | Performed by: INTERNAL MEDICINE

## 2023-12-20 ASSESSMENT — ENCOUNTER SYMPTOMS
PALPITATIONS: 0
DYSPNEA ON EXERTION: 0

## 2023-12-20 NOTE — PROGRESS NOTES
CARDIOLOGY OFFICE VISIT      CHIEF COMPLAINT  Chief Complaint   Patient presents with    Follow-up     Patient here for 6 months        HISTORY OF PRESENT ILLNESS  HPI    78-year-old  male who is followed for sinus node dysfunction status post dual-chamber pacemaker implant on June 22, 2018, coronary artery disease status post remote angioplasty, valvular heart disease, hypertension, and dyslipidemia with documented history of intolerance to statins.     Since the last office visit, he has been doing well.  He denies any symptoms of chest pain or feels breath or palpitations.    Patient lost his wife in August 2023 from sudden cardiac death.    EKG performed today shows atrial paced rhythm at a rate of 71 bpm QRS duration 92 ms QT corrected 590 ms.  Rhythm strip shows the same pattern.    Patient had a device interrogation performed today at the device clinic and it shows adequate sensing, capture and impedances of all leads.  Battery longevity 8 years.  No evidence of atrial fibrillation.  1 brief episode of nonsustained ventricular tachycardia lasting 1 second of duration.  No other significant findings.        Past Medical History  Past Medical History:   Diagnosis Date    Encounter for immunization 11/24/2021    Encounter for immunization    Ingrowing nail 03/03/2022    Ingrown toenail    Obesity, unspecified 05/02/2022    Class 2 obesity with body mass index (BMI) of 36.0 to 36.9 in adult    Obesity, unspecified 10/17/2022    Class 2 obesity with body mass index (BMI) of 37.0 to 37.9 in adult    Obesity, unspecified 07/27/2022    Class 2 obesity with body mass index (BMI) of 35.0 to 35.9 in adult    Obesity, unspecified 04/12/2022    Class 2 obesity with body mass index (BMI) of 35.0 to 35.9 in adult    Other acute postprocedural pain 05/09/2022    Post-op pain    Other conditions influencing health status 11/14/2020    Postoperative bleeding from mouth    Personal history of other diseases of the  digestive system 11/14/2020    History of oral hemorrhage    Personal history of other diseases of the musculoskeletal system and connective tissue 06/15/2022    History of neck pain    Personal history of other specified conditions 05/02/2022    History of fatigue    Personal history of other specified conditions 05/02/2022    History of abdominal pain    Stiffness of right hand, not elsewhere classified 07/06/2022    Stiffness of finger joint of right hand       Social History  Social History     Tobacco Use    Smoking status: Never     Passive exposure: Never    Smokeless tobacco: Never   Substance Use Topics    Alcohol use: Not Currently    Drug use: Never       Family History     Family History   Problem Relation Name Age of Onset    Arthritis Mother      Hypertension Father      Stroke Father      Hypertension Brother      Esophageal cancer Son          Allergies:  Allergies   Allergen Reactions    Metformin Other    Statins-Hmg-Coa Reductase Inhibitors Other    Glimepiride Other, Diarrhea and Palpitations        Outpatient Medications:  Current Outpatient Medications   Medication Instructions    aspirin 81 mg EC tablet 2 tablets, oral, Daily    cholecalciferol (Vitamin D3) 50 mcg (2,000 unit) capsule 1 capsule, oral, Daily    clopidogrel (Plavix) 75 mg tablet 1 tablet, oral, Daily    isosorbide mononitrate ER (Imdur) 60 mg 24 hr tablet 1 tablet, oral, Daily    Jardiance 25 mg     lisinopril 10 mg, oral, 2 times daily    metoprolol tartrate (LOPRESSOR) 25 mg, oral, 2 times daily    multivit-min/ferrous fumarate (MULTI VITAMIN ORAL) 1 tablet, oral, Daily    nitroglycerin (NITROSTAT) 0.4 mg, sublingual, Every 5 min PRN    OneTouch Ultra Test strip 1 strip, Daily    OneTouch Ultra2 Meter misc TEST ONCE A DAY    Repatha SureClick 140 mg, subcutaneous, Every 14 days          REVIEW OF SYSTEMS  Review of Systems   Cardiovascular:  Negative for chest pain, dyspnea on exertion and palpitations.   All other systems  reviewed and are negative.        VITALS  Vitals:    12/20/23 0840   BP: 134/82   Pulse: 71       PHYSICAL EXAM  Constitutional:       General: Awake.      Appearance: Normal and healthy appearance. Not in distress.   Neck:      Vascular: No JVR. JVD normal.   Pulmonary:      Effort: Pulmonary effort is normal.      Breath sounds: Normal breath sounds. No wheezing. No rhonchi. No rales.   Chest:      Chest wall: Not tender to palpatation.      Comments: Left sided device pocket- healed and well approximated. No lump or hematoma     Cardiovascular:      PMI at left midclavicular line. Normal rate. Regular rhythm. Normal S1. Normal S2.       Murmurs: There is no murmur.      No gallop.  No click. No rub.   Pulses:     Intact distal pulses.   Edema:     Peripheral edema absent.   Abdominal:      Tenderness: There is no abdominal tenderness.   Musculoskeletal: Normal range of motion.         General: No tenderness. Skin:     General: Skin is warm and dry.   Neurological:      General: No focal deficit present.      Mental Status: Alert and oriented to person, place and time.           ASSESSMENT AND PLAN    Clinical impressions:  1. Sinus node dysfunction status post dual-chamber pacemaker implant ([employer] [place] XT DR MRI) on June 22, 2018.  2. Coronary artery disease status post remote angioplasty with Lexiscan stress test dated June 7, 2018 revealing moderate inferior lateral infarct with no acute ischemic changes.  3. Left ventricular ejection fraction of 50-55% per 2D echo dated October 10, 2022.  4. Valvular heart disease consisting of 1+ MR and TR with mild increase in LV septal wall thickness, mild right ventricular enlargement, mild biatrial enlargement, right ventricular systolic pressure 31 mmHg, within the aortic root at 3.9 cm and ascending aorta at 3.7 cm.  5. Mild biatrial enlargement per 2D echocardiogram.  6. Hypertension, controlled  7. Dyslipidemia on Repatha with history of intolerance of  statins.  8. Class II obesity with a BMI 35.30.    Plan-recommendations    On the electrophysiologist on point he is doing well.  Will continue with current medical therapy and follow my office every 6 months or sooner needed.    Follow device clinic as scheduled.  Continue watching the burden of atrial and ventricular arrhythmias by device interrogation.    Risk factor modification and lifestyle modification discussed with patient. Diet , exercise and hydration discussed with patient.    Continue with beta-blocker therapy.    I have personally review with patient during this office visit, laboratory data, echocardiogram results, stress test results, Holter-event monitor results prior and after the last electrophysiology visit. All questions has been answered.    Please excuse any errors in grammar or translation related to this dictation.  Voice recognition software was utilized to prepare this document.

## 2023-12-20 NOTE — PATIENT INSTRUCTIONS
Continue same medications/treatment.  Patient educated on proper medication use.  Patient educated on risk factor modification.  Please bring any lab results from other providers/physicians to your next appointment.    Please bring all medicines, vitamins, and herbal supplements with you when you come to the office.    Prescriptions will not be filled unless you are compliant with your follow up appointments or have a follow up appointment scheduled as per instruction of your physician. Refills should be requested at the time of your visit.    Follow up with Ирина in 6 months with device check  Follow up with Dr. Lopez in 12 months with device check  Continue with remote checks at 3 and 9 months     YANG GARCIA RN, AM SCRIBING FOR, AND IN THE PRESENCE OF DR. MIKALA LOPEZ MD

## 2024-01-16 DIAGNOSIS — I10 BENIGN ESSENTIAL HYPERTENSION: ICD-10-CM

## 2024-01-17 RX ORDER — LISINOPRIL 10 MG/1
10 TABLET ORAL 2 TIMES DAILY
Qty: 180 TABLET | Refills: 3 | Status: SHIPPED | OUTPATIENT
Start: 2024-01-17

## 2024-01-25 ENCOUNTER — TELEPHONE (OUTPATIENT)
Dept: CARDIOLOGY | Facility: HOSPITAL | Age: 79
End: 2024-01-25
Payer: MEDICARE

## 2024-01-25 DIAGNOSIS — E78.2 HYPERLIPIDEMIA, MIXED: ICD-10-CM

## 2024-01-25 RX ORDER — EVOLOCUMAB 140 MG/ML
INJECTION, SOLUTION SUBCUTANEOUS
Qty: 6 ML | Refills: 3 | Status: SHIPPED | OUTPATIENT
Start: 2024-01-25

## 2024-01-25 RX ORDER — EVOLOCUMAB 140 MG/ML
INJECTION, SOLUTION SUBCUTANEOUS
Qty: 6 ML | Refills: 3 | Status: SHIPPED | OUTPATIENT
Start: 2024-01-25 | End: 2024-02-07 | Stop reason: WASHOUT

## 2024-02-01 ENCOUNTER — LAB (OUTPATIENT)
Dept: LAB | Facility: LAB | Age: 79
End: 2024-02-01
Payer: MEDICARE

## 2024-02-01 DIAGNOSIS — E11.9 TYPE 2 DIABETES MELLITUS WITHOUT COMPLICATION, WITHOUT LONG-TERM CURRENT USE OF INSULIN (MULTI): ICD-10-CM

## 2024-02-01 DIAGNOSIS — E11.9 TYPE 2 DIABETES MELLITUS WITHOUT COMPLICATION, WITHOUT LONG-TERM CURRENT USE OF INSULIN (MULTI): Primary | ICD-10-CM

## 2024-02-01 LAB
ALBUMIN SERPL BCP-MCNC: 4.1 G/DL (ref 3.4–5)
ALP SERPL-CCNC: 49 U/L (ref 33–136)
ALT SERPL W P-5'-P-CCNC: 13 U/L (ref 10–52)
ANION GAP SERPL CALC-SCNC: 12 MMOL/L (ref 10–20)
AST SERPL W P-5'-P-CCNC: 15 U/L (ref 9–39)
BILIRUB SERPL-MCNC: 0.6 MG/DL (ref 0–1.2)
BUN SERPL-MCNC: 16 MG/DL (ref 6–23)
CALCIUM SERPL-MCNC: 9.5 MG/DL (ref 8.6–10.3)
CHLORIDE SERPL-SCNC: 103 MMOL/L (ref 98–107)
CHOLEST SERPL-MCNC: 145 MG/DL (ref 0–199)
CHOLESTEROL/HDL RATIO: 3.3
CO2 SERPL-SCNC: 29 MMOL/L (ref 21–32)
CREAT SERPL-MCNC: 0.98 MG/DL (ref 0.5–1.3)
EGFRCR SERPLBLD CKD-EPI 2021: 79 ML/MIN/1.73M*2
EST. AVERAGE GLUCOSE BLD GHB EST-MCNC: 157 MG/DL
GLUCOSE SERPL-MCNC: 106 MG/DL (ref 74–99)
HBA1C MFR BLD: 7.1 %
HDLC SERPL-MCNC: 44.4 MG/DL
LDLC SERPL CALC-MCNC: 51 MG/DL
NON HDL CHOLESTEROL: 101 MG/DL (ref 0–149)
POTASSIUM SERPL-SCNC: 4.6 MMOL/L (ref 3.5–5.3)
PROT SERPL-MCNC: 7.1 G/DL (ref 6.4–8.2)
SODIUM SERPL-SCNC: 139 MMOL/L (ref 136–145)
TRIGL SERPL-MCNC: 247 MG/DL (ref 0–149)
VLDL: 49 MG/DL (ref 0–40)

## 2024-02-01 PROCEDURE — 80053 COMPREHEN METABOLIC PANEL: CPT

## 2024-02-01 PROCEDURE — 80061 LIPID PANEL: CPT

## 2024-02-01 PROCEDURE — 36415 COLL VENOUS BLD VENIPUNCTURE: CPT

## 2024-02-01 PROCEDURE — 83036 HEMOGLOBIN GLYCOSYLATED A1C: CPT

## 2024-02-07 ENCOUNTER — OFFICE VISIT (OUTPATIENT)
Dept: PRIMARY CARE | Facility: CLINIC | Age: 79
End: 2024-02-07
Payer: MEDICARE

## 2024-02-07 VITALS
WEIGHT: 229 LBS | TEMPERATURE: 97.2 F | DIASTOLIC BLOOD PRESSURE: 70 MMHG | HEIGHT: 70 IN | SYSTOLIC BLOOD PRESSURE: 110 MMHG | HEART RATE: 92 BPM | BODY MASS INDEX: 32.78 KG/M2

## 2024-02-07 DIAGNOSIS — E08.65 DIABETES MELLITUS DUE TO UNDERLYING CONDITION WITH HYPERGLYCEMIA, WITHOUT LONG-TERM CURRENT USE OF INSULIN (MULTI): ICD-10-CM

## 2024-02-07 DIAGNOSIS — Z78.9 NEVER SMOKED CIGARETTES: ICD-10-CM

## 2024-02-07 DIAGNOSIS — F43.29 GRIEF REACTION WITH PROLONGED BEREAVEMENT: ICD-10-CM

## 2024-02-07 DIAGNOSIS — E78.2 HYPERLIPIDEMIA, MIXED: ICD-10-CM

## 2024-02-07 PROCEDURE — 1160F RVW MEDS BY RX/DR IN RCRD: CPT | Performed by: FAMILY MEDICINE

## 2024-02-07 PROCEDURE — 3078F DIAST BP <80 MM HG: CPT | Performed by: FAMILY MEDICINE

## 2024-02-07 PROCEDURE — 99213 OFFICE O/P EST LOW 20 MIN: CPT | Performed by: FAMILY MEDICINE

## 2024-02-07 PROCEDURE — 1036F TOBACCO NON-USER: CPT | Performed by: FAMILY MEDICINE

## 2024-02-07 PROCEDURE — 3074F SYST BP LT 130 MM HG: CPT | Performed by: FAMILY MEDICINE

## 2024-02-07 PROCEDURE — 1159F MED LIST DOCD IN RCRD: CPT | Performed by: FAMILY MEDICINE

## 2024-02-07 PROCEDURE — 1157F ADVNC CARE PLAN IN RCRD: CPT | Performed by: FAMILY MEDICINE

## 2024-02-07 RX ORDER — BLOOD SUGAR DIAGNOSTIC
2 STRIP MISCELLANEOUS DAILY
Qty: 200 STRIP | Refills: 3 | Status: SHIPPED | OUTPATIENT
Start: 2024-02-07

## 2024-02-07 RX ORDER — LANCETS
EACH MISCELLANEOUS
Qty: 200 EACH | Refills: 3 | Status: SHIPPED | OUTPATIENT
Start: 2024-02-07

## 2024-02-07 NOTE — PROGRESS NOTES
Sumit Corral is here today for a follow-up on his diabetes and hypertension.  He states that he has not been doing well for the last several months.  When questioned further on this he states that he has been having a very difficult time with the death of his wife several months ago.  He denies feeling depressed.  His sleep had initially been fragmented but is now improved.  His appetite is normal.  He states that he has been interacting with others although he is not specific about this.  He does feel sad and somewhat lonely as well.  He continues on his meds noted.  He is trying to follow his diabetic diet and does get some exercise.  He has no other complaints at the present time.    Patient ID: Juan Loza is a 79 y.o. male who presents for Follow-up and Results:    Problem List Items Addressed This Visit    None  Visit Diagnoses       Grief reaction with prolonged bereavement               Past Medical History:   Diagnosis Date    Encounter for immunization 11/24/2021    Encounter for immunization    Ingrowing nail 03/03/2022    Ingrown toenail    Obesity, unspecified 05/02/2022    Class 2 obesity with body mass index (BMI) of 36.0 to 36.9 in adult    Obesity, unspecified 10/17/2022    Class 2 obesity with body mass index (BMI) of 37.0 to 37.9 in adult    Obesity, unspecified 07/27/2022    Class 2 obesity with body mass index (BMI) of 35.0 to 35.9 in adult    Obesity, unspecified 04/12/2022    Class 2 obesity with body mass index (BMI) of 35.0 to 35.9 in adult    Other acute postprocedural pain 05/09/2022    Post-op pain    Other conditions influencing health status 11/14/2020    Postoperative bleeding from mouth    Personal history of other diseases of the digestive system 11/14/2020    History of oral hemorrhage    Personal history of other diseases of the musculoskeletal system and connective tissue 06/15/2022    History of neck pain    Personal history of other specified conditions 05/02/2022     History of fatigue    Personal history of other specified conditions 05/02/2022    History of abdominal pain    Stiffness of right hand, not elsewhere classified 07/06/2022    Stiffness of finger joint of right hand      Past Surgical History:   Procedure Laterality Date    OTHER SURGICAL HISTORY  11/24/2021    Hip replacement    OTHER SURGICAL HISTORY  11/24/2021    Wrist surgery    OTHER SURGICAL HISTORY  11/24/2021    Percutaneous transluminal coronary angioplasty    OTHER SURGICAL HISTORY  11/24/2021    Pacemaker insertion    OTHER SURGICAL HISTORY  11/24/2021    Appendectomy    OTHER SURGICAL HISTORY  11/24/2021    Back surgery    OTHER SURGICAL HISTORY  11/24/2021    Esophagogastroduodenoscopy    OTHER SURGICAL HISTORY  11/24/2021    Tonsillectomy    OTHER SURGICAL HISTORY  11/24/2021    Varicose vein sclerotherapy    OTHER SURGICAL HISTORY  12/09/2021    Complete colonoscopy    OTHER SURGICAL HISTORY  12/15/2021    Cataract surgery    OTHER SURGICAL HISTORY  06/29/2022    Circumcision      Family History   Problem Relation Name Age of Onset    Arthritis Mother      Hypertension Father      Stroke Father      Hypertension Brother      Esophageal cancer Son        Social History     Socioeconomic History    Marital status:      Spouse name: Not on file    Number of children: Not on file    Years of education: Not on file    Highest education level: Not on file   Occupational History    Not on file   Tobacco Use    Smoking status: Never     Passive exposure: Never    Smokeless tobacco: Never   Substance and Sexual Activity    Alcohol use: Not Currently    Drug use: Never    Sexual activity: Defer   Other Topics Concern    Not on file   Social History Narrative    Not on file     Social Determinants of Health     Financial Resource Strain: Not on file   Food Insecurity: Not on file   Transportation Needs: Not on file   Physical Activity: Not on file   Stress: Not on file   Social Connections: Not on file    Intimate Partner Violence: Not on file   Housing Stability: Not on file      Metformin, Statins-hmg-coa reductase inhibitors, and Glimepiride   Current Outpatient Medications   Medication Sig Dispense Refill    aspirin 81 mg EC tablet Take 2 tablets (162 mg) by mouth once daily.      cholecalciferol (Vitamin D3) 50 mcg (2,000 unit) capsule Take 1 capsule (50 mcg) by mouth once daily.      clopidogrel (Plavix) 75 mg tablet TAKE ONE TABLET BY MOUTH DAILY 90 tablet 1    isosorbide mononitrate ER (Imdur) 60 mg 24 hr tablet Take 1 tablet (60 mg) by mouth once daily.      Jardiance 25 mg Take 1 tablet (25 mg) by mouth once daily.      lisinopril 10 mg tablet TAKE ONE TABLET BY MOUTH TWO TIMES A  tablet 3    metoprolol tartrate (Lopressor) 25 mg tablet TAKE ONE TABLET BY MOUTH TWO TIMES A  tablet 3    multivit-min/ferrous fumarate (MULTI VITAMIN ORAL) Take 1 tablet by mouth once daily.      nitroglycerin (Nitrostat) 0.4 mg SL tablet Place 1 tablet (0.4 mg) under the tongue every 5 minutes if needed for chest pain (FOR UP TO 3 DOSES AS NEEDED FOR CHEST PAIN.). 25 tablet 5    OneTouch Ultra Test strip 1 strip once daily.      OneTouch Ultra2 Meter misc TEST ONCE A DAY      Repatha SureClick 140 mg/mL injection ADMINISTER 140MG(1 INJECTION) UNDER THE SKIN EVERY 14 DAYS 6 mL 3    Repatha SureClick 140 mg/mL injection ADMINISTER 140MG(1 INJECTION) UNDER THE SKIN EVERY 14 DAYS (Patient not taking: Reported on 2/7/2024) 6 mL 3     No current facility-administered medications for this visit.       Immunization History   Administered Date(s) Administered    Influenza, High Dose Seasonal, Preservative Free 10/12/2015, 10/24/2017, 11/16/2018, 10/01/2019, 09/16/2020, 10/19/2021, 09/28/2022    Influenza, Unspecified 10/01/2014, 11/01/2015, 09/16/2016    Influenza, seasonal, injectable 09/23/2014    Influenza, seasonal, injectable, preservative free 11/26/2012    Moderna SARS-CoV-2 Vaccination 02/17/2021, 03/17/2021,  10/25/2021    Pneumococcal conjugate vaccine, 13-valent (PREVNAR 13) 11/30/2015, 10/28/2016    Pneumococcal polysaccharide vaccine, 23-valent, age 2 years and older (PNEUMOVAX 23) 03/21/2014, 11/17/2015    Zoster, Unspecified 08/31/2020, 11/16/2020    Zoster, live 01/01/2013        Review of Systems   Constitutional: Negative.    HENT: Negative.     Eyes: Negative.    Respiratory: Negative.     Cardiovascular: Negative.    Gastrointestinal: Negative.    Endocrine: Negative.    Genitourinary: Negative.    Musculoskeletal: Negative.    Skin: Negative.    Allergic/Immunologic: Negative.    Hematological: Negative.    Psychiatric/Behavioral: Negative.     All other systems reviewed and are negative.       Vitals:    02/07/24 1036   BP: 110/70   Pulse: 92   Temp: 36.2 °C (97.2 °F)     Vitals:    02/07/24 1036   Weight: 104 kg (229 lb)      Physical Exam  Constitutional:       General: He is not in acute distress.     Appearance: Normal appearance.   Neurological:      Mental Status: He is alert.          ASSESSMENT/PLAN: Diabetes mellitus type 2 with hyperglycemia A1c 7.1 and unchanged.  The patient and I discussed beginning a another medication along with his Jardiance for better sugar control.  The patient prefers not to begin any new medication.  I recommended that he continue with his diabetic diet and regular exercise.  Check CMP lipid profile and A1c and urine for microalbumin in 4 months    Grieving process with depression.  The patient and I spent a good deal of time discussing this.  We discussed medication for this which she adamantly refuses.  The patient is referred to  psychology and counseling services which she is agreeable to.  We discussed the importance of staying active with regular exercise and interacting with other people.    Hypertension stable.  Continue other meds  Hyperlipidemia stable with cholesterol 145 and LDL 51 triglycerides 247.  Continue Repatha under the direction of his  cardiologist.    Coronary artery disease.  Follow-up with cardiology on a regular basis as scheduled    Follow-up in 4 months and call as needed       Scribe Attestation  By signing my name below, I, Amee Manning LPN, Scribe   attest that this documentation has been prepared under the direction and in the presence of Donnie Farnsworth MD.

## 2024-02-08 ASSESSMENT — ENCOUNTER SYMPTOMS
EYES NEGATIVE: 1
CONSTITUTIONAL NEGATIVE: 1
MUSCULOSKELETAL NEGATIVE: 1
CARDIOVASCULAR NEGATIVE: 1
ENDOCRINE NEGATIVE: 1
RESPIRATORY NEGATIVE: 1
ALLERGIC/IMMUNOLOGIC NEGATIVE: 1
PSYCHIATRIC NEGATIVE: 1
HEMATOLOGIC/LYMPHATIC NEGATIVE: 1
GASTROINTESTINAL NEGATIVE: 1

## 2024-02-27 ENCOUNTER — OFFICE VISIT (OUTPATIENT)
Dept: ORTHOPEDIC SURGERY | Facility: CLINIC | Age: 79
End: 2024-02-27
Payer: MEDICARE

## 2024-02-27 DIAGNOSIS — M65.30 TRIGGER FINGER, ACQUIRED: Primary | ICD-10-CM

## 2024-02-27 PROCEDURE — 1159F MED LIST DOCD IN RCRD: CPT | Performed by: ORTHOPAEDIC SURGERY

## 2024-02-27 PROCEDURE — 20550 NJX 1 TENDON SHEATH/LIGAMENT: CPT | Mod: LT | Performed by: ORTHOPAEDIC SURGERY

## 2024-02-27 PROCEDURE — 1036F TOBACCO NON-USER: CPT | Performed by: ORTHOPAEDIC SURGERY

## 2024-02-27 PROCEDURE — 2500000004 HC RX 250 GENERAL PHARMACY W/ HCPCS (ALT 636 FOR OP/ED): Performed by: ORTHOPAEDIC SURGERY

## 2024-02-27 PROCEDURE — 99214 OFFICE O/P EST MOD 30 MIN: CPT | Performed by: ORTHOPAEDIC SURGERY

## 2024-02-27 PROCEDURE — 2500000005 HC RX 250 GENERAL PHARMACY W/O HCPCS: Performed by: ORTHOPAEDIC SURGERY

## 2024-02-27 PROCEDURE — 1157F ADVNC CARE PLAN IN RCRD: CPT | Performed by: ORTHOPAEDIC SURGERY

## 2024-02-27 PROCEDURE — 1160F RVW MEDS BY RX/DR IN RCRD: CPT | Performed by: ORTHOPAEDIC SURGERY

## 2024-02-27 RX ORDER — LIDOCAINE HYDROCHLORIDE 10 MG/ML
0.5 INJECTION INFILTRATION; PERINEURAL
Status: COMPLETED | OUTPATIENT
Start: 2024-02-27 | End: 2024-02-27

## 2024-02-27 RX ADMIN — TRIAMCINOLONE ACETONIDE 5 MG: 10 INJECTION, SUSPENSION INTRA-ARTICULAR; INTRALESIONAL at 14:06

## 2024-02-27 RX ADMIN — LIDOCAINE HYDROCHLORIDE 0.5 ML: 10 INJECTION, SOLUTION INFILTRATION; PERINEURAL at 14:06

## 2024-02-27 NOTE — PROGRESS NOTES
Procedure:    Hand / UE Inj/Asp: L ring A1 for trigger finger on 2/27/2024 2:06 PM  Indications: pain and tendon swelling  Details: 25 G needle, volar approach  Medications: 5 mg triamcinolone acetonide 10 mg/mL; 0.5 mL lidocaine 10 mg/mL (1 %)  Outcome: tolerated well, no immediate complications    Trigger Finger Injection: It was explained to the patient that the risks of a steroid injection include but are not limited to infection, local skin irritation, skin atrophy, calcification, continued pain and discomfort, elevation of blood sugar, burning, failure to relieve pain, and possibility of infection. The patient verbalized good insight and verbalized consent for the injection. It was further explained that the postinjection discomfort can be alleviated with additional medications, ice, elevation, and rest over the first 24 hours, and that these modalities are recommended.    Using aseptic technique, a solution containing 5 mg of Kenalog and 0.5 cc of 1% lidocaine without epinephrine was injected into the flexor retinacular sheath. A 25-gauge needle was advanced into the flexor retinacular sheath at the level of the A1 pulley and the steroid solution was injected slowly. A band-aid was then placed over the injection site. The patient was then asked to flex and extend the digits to help disperse the injection. The patient tolerated this trigger finger injection well. It should be noted that ethyl chloride spray was used to make the injection delivery more comfortable for the patient.  Procedure, treatment alternatives, risks and benefits explained, specific risks discussed. Consent was given by the patient. Immediately prior to procedure a time out was called to verify the correct patient, procedure, equipment, support staff and site/side marked as required. Patient was prepped and draped in the usual sterile fashion.         Raghu Elmore D.O.

## 2024-02-27 NOTE — PROGRESS NOTES
History present illness: Patient presents today for evaluation of triggering to the left ring finger.  He denies discrete injury.  It started in fall and was intermittent nature initially and now has worsened.      Past medical history: The patient's past medical history, family history, social history, and review of systems were documented on the patient medical intake.  The updated data was reviewed in the electronic medical record.  History is negative except otherwise stated in history of present illness.        Physical examination:  General: Alert and oriented to person, place, and time.  No acute distress and breathing comfortably: Pleasant and cooperative with examination.  HEENT: Head is normocephalic and atraumatic.  Neck: Supple, no visible swelling.  Cardiovascular: No palpable tachycardia  Lungs: No audible wheezing or labored breathing  Abdomen: Nondistended.  Extremities: Evaluation of the left upper extremity finds the patient had palpable radial artery at the wrist with brisk capillary refill to all digits.  Patient has intact sensation to axillary radial median and ulnar nerves.  There are no open wounds.  There are no signs of infection.  There is no evidence of lymphedema or lymphatic streaking.  The patient has supple compartments to left arm forearm and hand.  Mild Dupuytren's disease in line with left ring finger.  Clear focal tenderness and triggering to the left ring.      Radiology:      Assessment: Mild Dupuytren's disease affecting left ring finger at MCP with left ring finger trigger.      Plan: Treatment options were discussed.  Recommendations were made for observation of the mild Dupuytren's and steroid injection to left ring finger trigger.  Patient is agreeable.  Follow-up with me in the office in 4 weeks.  No x-rays upon return.        Procedure:

## 2024-03-27 ENCOUNTER — HOSPITAL ENCOUNTER (OUTPATIENT)
Dept: CARDIOLOGY | Facility: HOSPITAL | Age: 79
Discharge: HOME | End: 2024-03-27
Payer: MEDICARE

## 2024-03-27 DIAGNOSIS — I49.5 SICK SINUS SYNDROME (MULTI): ICD-10-CM

## 2024-03-27 DIAGNOSIS — Z95.0 PACEMAKER: ICD-10-CM

## 2024-03-27 PROCEDURE — 93296 REM INTERROG EVL PM/IDS: CPT

## 2024-03-27 PROCEDURE — 93294 REM INTERROG EVL PM/LDLS PM: CPT | Performed by: INTERNAL MEDICINE

## 2024-03-28 ENCOUNTER — OFFICE VISIT (OUTPATIENT)
Dept: ORTHOPEDIC SURGERY | Facility: CLINIC | Age: 79
End: 2024-03-28
Payer: MEDICARE

## 2024-03-28 DIAGNOSIS — M65.30 TRIGGER FINGER, ACQUIRED: Primary | ICD-10-CM

## 2024-03-28 PROCEDURE — 99213 OFFICE O/P EST LOW 20 MIN: CPT | Performed by: ORTHOPAEDIC SURGERY

## 2024-03-28 PROCEDURE — 1160F RVW MEDS BY RX/DR IN RCRD: CPT | Performed by: ORTHOPAEDIC SURGERY

## 2024-03-28 PROCEDURE — 1157F ADVNC CARE PLAN IN RCRD: CPT | Performed by: ORTHOPAEDIC SURGERY

## 2024-03-28 PROCEDURE — 1159F MED LIST DOCD IN RCRD: CPT | Performed by: ORTHOPAEDIC SURGERY

## 2024-03-28 PROCEDURE — 1036F TOBACCO NON-USER: CPT | Performed by: ORTHOPAEDIC SURGERY

## 2024-03-28 NOTE — PROGRESS NOTES
History present illness: Patient presents today for evaluation of the left ring finger.  He is status post left ring finger trigger injection.  His pain is now completely resolved.  He is having some right-sided cervical musculature spasm and wonders what to do about that.        Physical examination:  General: Alert and oriented to person, place, and time.  No acute distress and breathing comfortably: Pleasant and cooperative with examination.  Extremities: Evaluation of the left upper extremity finds the patient had palpable radial artery at the wrist with brisk capillary refill to all digits.  Patient has intact sensation to axillary radial median and ulnar nerves.  There are no open wounds.  There are no signs of infection.  There is no evidence of lymphedema or lymphatic streaking.  The patient has supple compartments to left arm forearm and hand.  No tenderness to left ring finger A1 pulley.  No mechanical triggering.  Mild Dupuytren's with minimal deformity to left ring finger ray at MCP.      Diagnostic studies:      Assessment: Left ring finger trigger finger responding well to nonoperative measures.  Spasm of cervical paraspinal musculature.      Plan: Recommendations were made for follow-up with our spine team for his neck.  As it pertains to the early Dupuytren's disease and left ring finger triggering recommendations made for simple observation and follow-up as needed.  Patient is agreeable with this strategy.      Procedure:        Procedure:

## 2024-04-16 ENCOUNTER — APPOINTMENT (OUTPATIENT)
Dept: ORTHOPEDIC SURGERY | Facility: CLINIC | Age: 79
End: 2024-04-16
Payer: MEDICARE

## 2024-06-06 ENCOUNTER — LAB (OUTPATIENT)
Dept: LAB | Facility: LAB | Age: 79
End: 2024-06-06
Payer: MEDICARE

## 2024-06-06 DIAGNOSIS — E78.2 HYPERLIPIDEMIA, MIXED: ICD-10-CM

## 2024-06-06 DIAGNOSIS — E08.65 DIABETES MELLITUS DUE TO UNDERLYING CONDITION WITH HYPERGLYCEMIA, WITHOUT LONG-TERM CURRENT USE OF INSULIN (MULTI): ICD-10-CM

## 2024-06-06 LAB
ALBUMIN SERPL BCP-MCNC: 4.1 G/DL (ref 3.4–5)
ALP SERPL-CCNC: 49 U/L (ref 33–136)
ALT SERPL W P-5'-P-CCNC: 14 U/L (ref 10–52)
ANION GAP SERPL CALC-SCNC: 10 MMOL/L (ref 10–20)
AST SERPL W P-5'-P-CCNC: 16 U/L (ref 9–39)
BILIRUB SERPL-MCNC: 0.5 MG/DL (ref 0–1.2)
BUN SERPL-MCNC: 20 MG/DL (ref 6–23)
CALCIUM SERPL-MCNC: 9.5 MG/DL (ref 8.6–10.3)
CHLORIDE SERPL-SCNC: 107 MMOL/L (ref 98–107)
CO2 SERPL-SCNC: 28 MMOL/L (ref 21–32)
CREAT SERPL-MCNC: 0.91 MG/DL (ref 0.5–1.3)
CREAT UR-MCNC: 88.5 MG/DL (ref 20–370)
EGFRCR SERPLBLD CKD-EPI 2021: 86 ML/MIN/1.73M*2
EST. AVERAGE GLUCOSE BLD GHB EST-MCNC: 146 MG/DL
GLUCOSE SERPL-MCNC: 112 MG/DL (ref 74–99)
HBA1C MFR BLD: 6.7 %
MICROALBUMIN UR-MCNC: <7 MG/L
MICROALBUMIN/CREAT UR: NORMAL MG/G{CREAT}
POTASSIUM SERPL-SCNC: 4.9 MMOL/L (ref 3.5–5.3)
PROT SERPL-MCNC: 7 G/DL (ref 6.4–8.2)
SODIUM SERPL-SCNC: 140 MMOL/L (ref 136–145)

## 2024-06-06 PROCEDURE — 82043 UR ALBUMIN QUANTITATIVE: CPT

## 2024-06-06 PROCEDURE — 83036 HEMOGLOBIN GLYCOSYLATED A1C: CPT

## 2024-06-06 PROCEDURE — 36415 COLL VENOUS BLD VENIPUNCTURE: CPT

## 2024-06-06 PROCEDURE — 82570 ASSAY OF URINE CREATININE: CPT

## 2024-06-06 PROCEDURE — 80053 COMPREHEN METABOLIC PANEL: CPT

## 2024-06-13 ENCOUNTER — APPOINTMENT (OUTPATIENT)
Dept: PRIMARY CARE | Facility: CLINIC | Age: 79
End: 2024-06-13
Payer: MEDICARE

## 2024-06-13 VITALS
HEART RATE: 66 BPM | HEIGHT: 71 IN | SYSTOLIC BLOOD PRESSURE: 120 MMHG | TEMPERATURE: 98 F | BODY MASS INDEX: 31.92 KG/M2 | DIASTOLIC BLOOD PRESSURE: 71 MMHG | WEIGHT: 228 LBS

## 2024-06-13 DIAGNOSIS — Z78.9 NEVER SMOKED CIGARETTES: ICD-10-CM

## 2024-06-13 DIAGNOSIS — E66.9 CLASS 1 OBESITY WITH BODY MASS INDEX (BMI) OF 32.0 TO 32.9 IN ADULT, UNSPECIFIED OBESITY TYPE, UNSPECIFIED WHETHER SERIOUS COMORBIDITY PRESENT: ICD-10-CM

## 2024-06-13 DIAGNOSIS — M54.2 NECK PAIN: ICD-10-CM

## 2024-06-13 DIAGNOSIS — Z12.5 PROSTATE CANCER SCREENING: ICD-10-CM

## 2024-06-13 DIAGNOSIS — Z00.00 MEDICARE ANNUAL WELLNESS VISIT, SUBSEQUENT: ICD-10-CM

## 2024-06-13 DIAGNOSIS — E08.65 DIABETES MELLITUS DUE TO UNDERLYING CONDITION WITH HYPERGLYCEMIA, WITHOUT LONG-TERM CURRENT USE OF INSULIN (MULTI): ICD-10-CM

## 2024-06-13 DIAGNOSIS — E78.2 HYPERLIPIDEMIA, MIXED: ICD-10-CM

## 2024-06-13 PROBLEM — E66.811 CLASS 1 OBESITY WITH BODY MASS INDEX (BMI) OF 32.0 TO 32.9 IN ADULT: Status: ACTIVE | Noted: 2024-06-13

## 2024-06-13 PROCEDURE — 1170F FXNL STATUS ASSESSED: CPT | Performed by: FAMILY MEDICINE

## 2024-06-13 PROCEDURE — 1036F TOBACCO NON-USER: CPT | Performed by: FAMILY MEDICINE

## 2024-06-13 PROCEDURE — 1159F MED LIST DOCD IN RCRD: CPT | Performed by: FAMILY MEDICINE

## 2024-06-13 PROCEDURE — 1157F ADVNC CARE PLAN IN RCRD: CPT | Performed by: FAMILY MEDICINE

## 2024-06-13 PROCEDURE — 3074F SYST BP LT 130 MM HG: CPT | Performed by: FAMILY MEDICINE

## 2024-06-13 PROCEDURE — G0439 PPPS, SUBSEQ VISIT: HCPCS | Performed by: FAMILY MEDICINE

## 2024-06-13 PROCEDURE — 1160F RVW MEDS BY RX/DR IN RCRD: CPT | Performed by: FAMILY MEDICINE

## 2024-06-13 PROCEDURE — 3078F DIAST BP <80 MM HG: CPT | Performed by: FAMILY MEDICINE

## 2024-06-13 ASSESSMENT — ENCOUNTER SYMPTOMS
ALLERGIC/IMMUNOLOGIC NEGATIVE: 1
RESPIRATORY NEGATIVE: 1
EYES NEGATIVE: 1
PSYCHIATRIC NEGATIVE: 1
HEMATOLOGIC/LYMPHATIC NEGATIVE: 1
ENDOCRINE NEGATIVE: 1
GASTROINTESTINAL NEGATIVE: 1
CONSTITUTIONAL NEGATIVE: 1
CARDIOVASCULAR NEGATIVE: 1
NECK PAIN: 1

## 2024-06-13 ASSESSMENT — PATIENT HEALTH QUESTIONNAIRE - PHQ9
SUM OF ALL RESPONSES TO PHQ9 QUESTIONS 1 AND 2: 0
1. LITTLE INTEREST OR PLEASURE IN DOING THINGS: NOT AT ALL
2. FEELING DOWN, DEPRESSED OR HOPELESS: NOT AT ALL

## 2024-06-13 ASSESSMENT — ACTIVITIES OF DAILY LIVING (ADL)
DRESSING: INDEPENDENT
TAKING_MEDICATION: INDEPENDENT
DOING_HOUSEWORK: INDEPENDENT
MANAGING_FINANCES: INDEPENDENT
BATHING: INDEPENDENT
GROCERY_SHOPPING: INDEPENDENT

## 2024-06-13 NOTE — PROGRESS NOTES
Sumit Corral is here for annual wellness visit and a follow-up on his diabetes.  He states that he has been feeling well overall.  He has been trying to follow his diet closely.  His fasting blood sugars at home are averaging about 100-110.  He continues on his meds noted.  His only complaint is that of chronic neck pain that he experiences when moving his head from side-to-side.  He had seen Dr. Stokes in the past regarding this and did go to physical therapy which was not helpful.  He has no other complaints at the present time.    Patient ID: Juan Loza is a 79 y.o. male who presents for Medicare Annual Wellness Visit Subsequent:    Problem List Items Addressed This Visit    None     Past Medical History:   Diagnosis Date    Encounter for immunization 11/24/2021    Encounter for immunization    Ingrowing nail 03/03/2022    Ingrown toenail    Obesity, unspecified 05/02/2022    Class 2 obesity with body mass index (BMI) of 36.0 to 36.9 in adult    Obesity, unspecified 10/17/2022    Class 2 obesity with body mass index (BMI) of 37.0 to 37.9 in adult    Obesity, unspecified 07/27/2022    Class 2 obesity with body mass index (BMI) of 35.0 to 35.9 in adult    Obesity, unspecified 04/12/2022    Class 2 obesity with body mass index (BMI) of 35.0 to 35.9 in adult    Other acute postprocedural pain 05/09/2022    Post-op pain    Other conditions influencing health status 11/14/2020    Postoperative bleeding from mouth    Personal history of other diseases of the digestive system 11/14/2020    History of oral hemorrhage    Personal history of other diseases of the musculoskeletal system and connective tissue 06/15/2022    History of neck pain    Personal history of other specified conditions 05/02/2022    History of fatigue    Personal history of other specified conditions 05/02/2022    History of abdominal pain    Stiffness of right hand, not elsewhere classified 07/06/2022    Stiffness of finger joint of right  hand      Past Surgical History:   Procedure Laterality Date    OTHER SURGICAL HISTORY  11/24/2021    Hip replacement    OTHER SURGICAL HISTORY  11/24/2021    Wrist surgery    OTHER SURGICAL HISTORY  11/24/2021    Percutaneous transluminal coronary angioplasty    OTHER SURGICAL HISTORY  11/24/2021    Pacemaker insertion    OTHER SURGICAL HISTORY  11/24/2021    Appendectomy    OTHER SURGICAL HISTORY  11/24/2021    Back surgery    OTHER SURGICAL HISTORY  11/24/2021    Esophagogastroduodenoscopy    OTHER SURGICAL HISTORY  11/24/2021    Tonsillectomy    OTHER SURGICAL HISTORY  11/24/2021    Varicose vein sclerotherapy    OTHER SURGICAL HISTORY  12/09/2021    Complete colonoscopy    OTHER SURGICAL HISTORY  12/15/2021    Cataract surgery    OTHER SURGICAL HISTORY  06/29/2022    Circumcision      Family History   Problem Relation Name Age of Onset    Arthritis Mother      Hypertension Father      Stroke Father      Hypertension Brother      Esophageal cancer Son        Social History     Socioeconomic History    Marital status:      Spouse name: Not on file    Number of children: Not on file    Years of education: Not on file    Highest education level: Not on file   Occupational History    Not on file   Tobacco Use    Smoking status: Never     Passive exposure: Never    Smokeless tobacco: Never   Vaping Use    Vaping status: Never Used   Substance and Sexual Activity    Alcohol use: Yes     Alcohol/week: 1.0 standard drink of alcohol     Types: 1 Standard drinks or equivalent per week    Drug use: Never    Sexual activity: Defer   Other Topics Concern    Not on file   Social History Narrative    Not on file     Social Determinants of Health     Financial Resource Strain: Not on file   Food Insecurity: Not on file   Transportation Needs: Not on file   Physical Activity: Not on file   Stress: Not on file   Social Connections: Not on file   Intimate Partner Violence: Not on file   Housing Stability: Not on file       Metformin, Statins-hmg-coa reductase inhibitors, and Glimepiride   Current Outpatient Medications   Medication Sig Dispense Refill    aspirin 81 mg EC tablet Take 2 tablets (162 mg) by mouth once daily.      cholecalciferol (Vitamin D3) 50 mcg (2,000 unit) capsule Take 1 capsule (50 mcg) by mouth once daily.      clopidogrel (Plavix) 75 mg tablet TAKE ONE TABLET BY MOUTH DAILY 90 tablet 1    isosorbide mononitrate ER (Imdur) 60 mg 24 hr tablet Take 1 tablet (60 mg) by mouth once daily.      Jardiance 25 mg Take 1 tablet (25 mg) by mouth once daily.      lancets misc Use twice daily to check blood sugar 200 each 3    lisinopril 10 mg tablet TAKE ONE TABLET BY MOUTH TWO TIMES A  tablet 3    metoprolol tartrate (Lopressor) 25 mg tablet TAKE ONE TABLET BY MOUTH TWO TIMES A  tablet 3    multivit-min/ferrous fumarate (MULTI VITAMIN ORAL) Take 1 tablet by mouth once daily.      nitroglycerin (Nitrostat) 0.4 mg SL tablet Place 1 tablet (0.4 mg) under the tongue every 5 minutes if needed for chest pain (FOR UP TO 3 DOSES AS NEEDED FOR CHEST PAIN.). 25 tablet 5    OneTouch Ultra Test strip Inject 2 strips under the skin once daily. 200 strip 3    OneTouch Ultra2 Meter misc TEST ONCE A DAY      Repatha SureClick 140 mg/mL injection ADMINISTER 140MG(1 INJECTION) UNDER THE SKIN EVERY 14 DAYS 6 mL 3     No current facility-administered medications for this visit.       Immunization History   Administered Date(s) Administered    Influenza, High Dose Seasonal, Preservative Free 10/12/2015, 10/24/2017, 11/16/2018, 10/01/2019, 09/16/2020, 10/19/2021, 09/28/2022    Influenza, Unspecified 10/01/2014, 11/01/2015, 09/16/2016    Influenza, seasonal, injectable 09/23/2014    Influenza, seasonal, injectable, preservative free 11/26/2012    Moderna SARS-CoV-2 Vaccination 02/17/2021, 03/17/2021, 10/25/2021    Pneumococcal conjugate vaccine, 13-valent (PREVNAR 13) 11/30/2015, 10/28/2016    Pneumococcal polysaccharide vaccine,  23-valent, age 2 years and older (PNEUMOVAX 23) 03/21/2014, 11/17/2015    Zoster, Unspecified 08/31/2020, 11/16/2020    Zoster, live 01/01/2013        Review of Systems   Constitutional: Negative.    HENT: Negative.     Eyes: Negative.    Respiratory: Negative.     Cardiovascular: Negative.    Gastrointestinal: Negative.    Endocrine: Negative.    Genitourinary: Negative.    Musculoskeletal:  Positive for neck pain.   Skin: Negative.    Allergic/Immunologic: Negative.    Hematological: Negative.    Psychiatric/Behavioral: Negative.     All other systems reviewed and are negative.       Vitals:    06/13/24 0853   BP: 120/71   Pulse: 66   Temp: 36.7 °C (98 °F)     Vitals:    06/13/24 0853   Weight: 103 kg (228 lb)      Physical Exam  Constitutional:       General: He is not in acute distress.     Appearance: Normal appearance.   Cardiovascular:      Rate and Rhythm: Normal rate and regular rhythm.      Pulses: Normal pulses.      Heart sounds: Normal heart sounds. No murmur heard.     No friction rub. No gallop.   Pulmonary:      Effort: Pulmonary effort is normal. No respiratory distress.      Breath sounds: Normal breath sounds. No wheezing.   Neurological:      General: No focal deficit present.      Mental Status: He is alert and oriented to person, place, and time. Mental status is at baseline.   Psychiatric:         Mood and Affect: Mood normal.         Thought Content: Thought content normal.        Annual wellness visit  ASSESSMENT/PLAN: Diabetes mellitus type 2 improved with A1c 6.7.  Continue to follow diet closely and exercise regularly.  Continue Jardiance 25 mg daily.  Recheck CMP A1c in 4 months.    Hyperlipidemia currently being treated with Repatha per cardiology.  Check lipid profile in 4 months.    Hypertension stable.  Continue meds as noted.    Coronary artery disease.  Follow-up with cardiology as scheduled.  Recommended that patient discuss with cardiology regarding his ongoing use of  aspirin.    Colonoscopy up-to-date.  Need to update immunizations including Tdap and RSV  Follow-up in 4 months and call as needed     Scribe Attestation  By signing my name below, I, Amee Manning LPN, Scribe   attest that this documentation has been prepared under the direction and in the presence of Donnie Farnsworth MD.

## 2024-06-17 ENCOUNTER — APPOINTMENT (OUTPATIENT)
Dept: CARDIOLOGY | Facility: CLINIC | Age: 79
End: 2024-06-17
Payer: MEDICARE

## 2024-06-17 VITALS
BODY MASS INDEX: 31.78 KG/M2 | DIASTOLIC BLOOD PRESSURE: 64 MMHG | WEIGHT: 227 LBS | HEART RATE: 80 BPM | HEIGHT: 71 IN | SYSTOLIC BLOOD PRESSURE: 106 MMHG

## 2024-06-17 DIAGNOSIS — I25.5 ISCHEMIC CARDIOMYOPATHY: ICD-10-CM

## 2024-06-17 DIAGNOSIS — E78.2 HYPERLIPIDEMIA, MIXED: ICD-10-CM

## 2024-06-17 DIAGNOSIS — Z78.9 NEVER SMOKED CIGARETTES: ICD-10-CM

## 2024-06-17 DIAGNOSIS — I10 BENIGN ESSENTIAL HYPERTENSION: ICD-10-CM

## 2024-06-17 DIAGNOSIS — I87.309 CHRONIC PERIPHERAL VENOUS HYPERTENSION: ICD-10-CM

## 2024-06-17 DIAGNOSIS — I25.10 CAD S/P PERCUTANEOUS CORONARY ANGIOPLASTY: ICD-10-CM

## 2024-06-17 DIAGNOSIS — E66.9 CLASS 1 OBESITY WITHOUT SERIOUS COMORBIDITY WITH BODY MASS INDEX (BMI) OF 32.0 TO 32.9 IN ADULT, UNSPECIFIED OBESITY TYPE: ICD-10-CM

## 2024-06-17 DIAGNOSIS — Z98.61 CAD S/P PERCUTANEOUS CORONARY ANGIOPLASTY: ICD-10-CM

## 2024-06-17 PROCEDURE — 1036F TOBACCO NON-USER: CPT | Performed by: INTERNAL MEDICINE

## 2024-06-17 PROCEDURE — 3078F DIAST BP <80 MM HG: CPT | Performed by: INTERNAL MEDICINE

## 2024-06-17 PROCEDURE — 1157F ADVNC CARE PLAN IN RCRD: CPT | Performed by: INTERNAL MEDICINE

## 2024-06-17 PROCEDURE — 1159F MED LIST DOCD IN RCRD: CPT | Performed by: INTERNAL MEDICINE

## 2024-06-17 PROCEDURE — 99214 OFFICE O/P EST MOD 30 MIN: CPT | Performed by: INTERNAL MEDICINE

## 2024-06-17 PROCEDURE — 3074F SYST BP LT 130 MM HG: CPT | Performed by: INTERNAL MEDICINE

## 2024-06-17 RX ORDER — ASPIRIN 81 MG/1
81 TABLET ORAL DAILY
Start: 2024-06-17 | End: 2025-06-17

## 2024-06-17 NOTE — PROGRESS NOTES
Patient:  Juan Loza  YOB: 1945  MRN: [id number]       Chief Complaint/Active Symptoms:       Juan Loza is a 79 y.o. male who returns today for cardiac follow-up.  She is doing well.  Denies chest pain or shortness of breath.  His blood sugars he claims have been very well-controlled.  He is having no other issues.  He is losing weight and exercising regularly.      Objective:     Vitals:    06/17/24 1249   BP: 106/64   Pulse: 80       Vitals:    06/17/24 1249   Weight: 103 kg (227 lb)       Allergies:     Allergies   Allergen Reactions    Metformin Other    Statins-Hmg-Coa Reductase Inhibitors Other    Glimepiride Other, Diarrhea and Palpitations          Medications:     Current Outpatient Medications   Medication Instructions    aspirin 81 mg EC tablet 2 tablets, oral, Daily    cholecalciferol (Vitamin D3) 50 mcg (2,000 unit) capsule 1 capsule, oral, Daily    clopidogrel (PLAVIX) 75 mg, oral, Daily    isosorbide mononitrate ER (Imdur) 60 mg 24 hr tablet 1 tablet, oral, Daily    Jardiance 25 mg Take 1 tablet (25 mg) by mouth once daily.    lancets misc Use twice daily to check blood sugar    lisinopril 10 mg, oral, 2 times daily    metoprolol tartrate (LOPRESSOR) 25 mg, oral, 2 times daily    multivit-min/ferrous fumarate (MULTI VITAMIN ORAL) 1 tablet, oral, Daily    nitroglycerin (NITROSTAT) 0.4 mg, sublingual, Every 5 min PRN    OneTouch Ultra Test strip 2 strips, subcutaneous, Daily    OneTouch Ultra2 Meter misc TEST ONCE A DAY    Repatha SureClick 140 mg/mL injection ADMINISTER 140MG(1 INJECTION) UNDER THE SKIN EVERY 14 DAYS       Physical Examination:   Constitutional:       Appearance: Healthy appearance. Not in distress.   Neck:      Vascular: No JVR. JVD normal.   Pulmonary:      Effort: Pulmonary effort is normal.      Breath sounds: Normal breath sounds. No wheezing. No rhonchi. No rales.   Chest:      Chest wall: Not tender to palpatation.   Cardiovascular:      PMI at left  "midclavicular line. Normal rate. Regular rhythm. Normal S1. Normal S2.       Murmurs: There is no murmur.      No gallop.  No click. No rub.   Pulses:     Intact distal pulses.   Edema:     Peripheral edema absent.   Abdominal:      General: Bowel sounds are normal.      Palpations: Abdomen is soft.      Tenderness: There is no abdominal tenderness.   Musculoskeletal: Normal range of motion.         General: No tenderness. Skin:     General: Skin is warm and dry.   Neurological:      General: No focal deficit present.      Mental Status: Alert and oriented to person, place and time.            Lab:     CBC:   Lab Results   Component Value Date    WBC 6.2 09/28/2023    RBC 5.08 09/28/2023    HGB 15.6 09/28/2023    HCT 47.6 09/28/2023     09/28/2023        CMP:    Lab Results   Component Value Date     06/06/2024    K 4.9 06/06/2024     06/06/2024    CO2 28 06/06/2024    BUN 20 06/06/2024    CREATININE 0.91 06/06/2024    GLUCOSE 112 (H) 06/06/2024    CALCIUM 9.5 06/06/2024       Magnesium:    No results found for: \"MG\"    Lipid Profile:    Lab Results   Component Value Date    TRIG 247 (H) 02/01/2024    HDL 44.4 02/01/2024    LDLCALC 51 02/01/2024       TSH:    Lab Results   Component Value Date    TSH 0.45 05/02/2022       BNP:   No results found for: \"BNP\"     PT/INR:    No results found for: \"PROTIME\", \"INR\"    HgBA1c:    Lab Results   Component Value Date    HGBA1C 6.7 (H) 06/06/2024       BMP:  Lab Results   Component Value Date     06/06/2024     02/01/2024     09/28/2023     05/24/2023     01/25/2023    K 4.9 06/06/2024    K 4.6 02/01/2024    K 4.7 09/28/2023    K 4.4 05/24/2023    K 4.5 01/25/2023     06/06/2024     02/01/2024     09/28/2023     05/24/2023     01/25/2023    CO2 28 06/06/2024    CO2 29 02/01/2024    CO2 27 09/28/2023    CO2 25 05/24/2023    CO2 25 01/25/2023    BUN 20 06/06/2024    BUN 16 02/01/2024    BUN 22 " "09/28/2023    BUN 19 05/24/2023    BUN 19 01/25/2023    CREATININE 0.91 06/06/2024    CREATININE 0.98 02/01/2024    CREATININE 0.95 09/28/2023    CREATININE 0.90 05/24/2023    CREATININE 0.87 01/25/2023       CBC:  Lab Results   Component Value Date    WBC 6.2 09/28/2023    WBC 6.2 05/02/2022    WBC 7.0 04/07/2022    RBC 5.08 09/28/2023    RBC 5.09 05/02/2022    RBC 5.27 04/07/2022    HGB 15.6 09/28/2023    HGB 15.7 05/02/2022    HGB 16.2 04/07/2022    HCT 47.6 09/28/2023    HCT 49.5 05/02/2022    HCT 50.5 04/07/2022    MCV 94 09/28/2023    MCV 97 05/02/2022    MCV 96 04/07/2022    MCHC 32.8 09/28/2023    MCHC 31.7 (L) 05/02/2022    MCHC 32.1 04/07/2022    RDW 13.1 09/28/2023    RDW 12.9 05/02/2022    RDW 13.8 04/07/2022     09/28/2023     05/02/2022     04/07/2022       Cardiac Enzymes:    No results found for: \"TROPHS\"    Hepatic Function Panel:    Lab Results   Component Value Date    ALKPHOS 49 06/06/2024    ALT 14 06/06/2024    AST 16 06/06/2024    PROT 7.0 06/06/2024    BILITOT 0.5 06/06/2024         Diagnostic Studies:     No results found.    EKG:   No results found for: \"EKG\"      Radiology:     No orders to display       Assessment/Plan:         Patient Active Problem List   Diagnosis    Anxiety    Arthralgia    Atypical chest pain    Benign essential hypertension    CAD S/P percutaneous coronary angioplasty    Cardiac pacemaker in situ    Chronic peripheral venous hypertension    Depression    DJD (degenerative joint disease)    Fibrillation, atrial (Multi)    Hyperlipidemia, mixed    Ischemic cardiomyopathy    Lesion of vertebra    Lower urinary tract symptoms (LUTS)    Lumbar radiculopathy, chronic    Non-ST elevated myocardial infarction (Multi)    Nontraumatic incomplete tear of right rotator cuff    Osteoarthritis, hand    Phimosis    Pulmonary nodule    Rising PSA level    Shoulder pain    Tinnitus of both ears    Trigger finger    Never smoked cigarettes    Diabetes mellitus " (Multi)    Medicare annual wellness visit, subsequent    Class 1 obesity with body mass index (BMI) of 32.0 to 32.9 in adult         ASSESSMENT       79-year-old gentleman seen and evaluated today in follow-up for cardiovascular disease.     Meds, vitals, examination as noted.     Chart review detail discussed the patient at length.     Impression:     ASHD class I-II  History of PCI and stenting  Sinus node dysfunction  Permanent pacemaker  Paroxysmal A-fib  Myalgias  Obesity  Dyslipidemia with statin intolerance.  PLAN     Recommendation:  Continue current meds  Continue exercise and activity  See me back at the end of the year  Call if any issues or problems otherwise develop

## 2024-06-17 NOTE — PATIENT INSTRUCTIONS
Continue same medications/treatment.  Patient educated on proper medication use.  Patient educated on risk factor modification.  Please bring any lab results from other providers/physicians to your next appointment.    Please bring all medicines, vitamins, and herbal supplements with you when you come to the office.    Prescriptions will not be filled unless you are compliant with your follow up appointments or have a follow up appointment scheduled as per instruction of your physician. Refills should be requested at the time of your visit.    Follow up in     I, LARA VENEGAS RN, AM SCRIBING FOR AND IN THE PRESENCE OF DR. PANTERA JONES, DO, FACC

## 2024-06-20 ENCOUNTER — HOSPITAL ENCOUNTER (OUTPATIENT)
Dept: CARDIOLOGY | Facility: HOSPITAL | Age: 79
Discharge: HOME | End: 2024-06-20
Payer: MEDICARE

## 2024-06-20 ENCOUNTER — APPOINTMENT (OUTPATIENT)
Dept: CARDIOLOGY | Facility: CLINIC | Age: 79
End: 2024-06-20
Payer: MEDICARE

## 2024-06-20 VITALS
DIASTOLIC BLOOD PRESSURE: 62 MMHG | WEIGHT: 229 LBS | HEART RATE: 70 BPM | SYSTOLIC BLOOD PRESSURE: 104 MMHG | HEIGHT: 71 IN | BODY MASS INDEX: 32.06 KG/M2

## 2024-06-20 DIAGNOSIS — E66.9 CLASS 1 OBESITY WITHOUT SERIOUS COMORBIDITY WITH BODY MASS INDEX (BMI) OF 32.0 TO 32.9 IN ADULT, UNSPECIFIED OBESITY TYPE: ICD-10-CM

## 2024-06-20 DIAGNOSIS — I48.91 ATRIAL FIBRILLATION, UNSPECIFIED TYPE (MULTI): Primary | ICD-10-CM

## 2024-06-20 DIAGNOSIS — Z95.0 CARDIAC PACEMAKER IN SITU: ICD-10-CM

## 2024-06-20 DIAGNOSIS — Z98.61 CAD S/P PERCUTANEOUS CORONARY ANGIOPLASTY: ICD-10-CM

## 2024-06-20 DIAGNOSIS — I25.10 CAD S/P PERCUTANEOUS CORONARY ANGIOPLASTY: ICD-10-CM

## 2024-06-20 DIAGNOSIS — I21.4 NON-ST ELEVATED MYOCARDIAL INFARCTION (MULTI): ICD-10-CM

## 2024-06-20 DIAGNOSIS — I25.5 ISCHEMIC CARDIOMYOPATHY: ICD-10-CM

## 2024-06-20 DIAGNOSIS — E78.2 HYPERLIPIDEMIA, MIXED: ICD-10-CM

## 2024-06-20 DIAGNOSIS — I10 BENIGN ESSENTIAL HYPERTENSION: ICD-10-CM

## 2024-06-20 PROCEDURE — 93280 PM DEVICE PROGR EVAL DUAL: CPT | Performed by: INTERNAL MEDICINE

## 2024-06-20 PROCEDURE — 1157F ADVNC CARE PLAN IN RCRD: CPT | Performed by: NURSE PRACTITIONER

## 2024-06-20 PROCEDURE — 3074F SYST BP LT 130 MM HG: CPT | Performed by: NURSE PRACTITIONER

## 2024-06-20 PROCEDURE — 1160F RVW MEDS BY RX/DR IN RCRD: CPT | Performed by: NURSE PRACTITIONER

## 2024-06-20 PROCEDURE — 93000 ELECTROCARDIOGRAM COMPLETE: CPT | Mod: DISTINCT PROCEDURAL SERVICE | Performed by: INTERNAL MEDICINE

## 2024-06-20 PROCEDURE — 1159F MED LIST DOCD IN RCRD: CPT | Performed by: NURSE PRACTITIONER

## 2024-06-20 PROCEDURE — 3078F DIAST BP <80 MM HG: CPT | Performed by: NURSE PRACTITIONER

## 2024-06-20 PROCEDURE — 1036F TOBACCO NON-USER: CPT | Performed by: NURSE PRACTITIONER

## 2024-06-20 PROCEDURE — 99214 OFFICE O/P EST MOD 30 MIN: CPT | Performed by: NURSE PRACTITIONER

## 2024-06-20 PROCEDURE — 93280 PM DEVICE PROGR EVAL DUAL: CPT

## 2024-06-20 NOTE — PROGRESS NOTES
"CARDIOLOGY OFFICE VISIT      CHIEF COMPLAINT  Chief Complaint   Patient presents with    Device Check     Doing fine.\"    History: The patient is a 79-year-old  male who is followed for sinus node dysfunction status post dual-chamber pacemaker implant on June 22, 2018, coronary artery disease status post remote angioplasty, valvular heart disease, hypertension, dyslipidemia with intolerance to statins.  He denies chest pain, palpitations, dizziness, lightheadedness, shortness of breath, abdominal distention, or lower extremity edema.  He questions when he should be utilizing nitroglycerin and how it is administered.  He states it was reviewed in the past but he has never had to use it and now he would like a \"refresher.\"  HISTORY OF PRESENT ILLNESS  HPI    Past Medical History  Past Medical History:   Diagnosis Date    Encounter for immunization 11/24/2021    Encounter for immunization    Ingrowing nail 03/03/2022    Ingrown toenail    Obesity, unspecified 05/02/2022    Class 2 obesity with body mass index (BMI) of 36.0 to 36.9 in adult    Obesity, unspecified 10/17/2022    Class 2 obesity with body mass index (BMI) of 37.0 to 37.9 in adult    Obesity, unspecified 07/27/2022    Class 2 obesity with body mass index (BMI) of 35.0 to 35.9 in adult    Obesity, unspecified 04/12/2022    Class 2 obesity with body mass index (BMI) of 35.0 to 35.9 in adult    Other acute postprocedural pain 05/09/2022    Post-op pain    Other conditions influencing health status 11/14/2020    Postoperative bleeding from mouth    Personal history of other diseases of the digestive system 11/14/2020    History of oral hemorrhage    Personal history of other diseases of the musculoskeletal system and connective tissue 06/15/2022    History of neck pain    Personal history of other specified conditions 05/02/2022    History of fatigue    Personal history of other specified conditions 05/02/2022    History of abdominal pain    Stiffness " of right hand, not elsewhere classified 07/06/2022    Stiffness of finger joint of right hand       Social History  Social History     Tobacco Use    Smoking status: Never     Passive exposure: Never    Smokeless tobacco: Never   Vaping Use    Vaping status: Never Used   Substance Use Topics    Alcohol use: Not Currently     Comment: 3 beers weekly    Drug use: Never       Family History     Family History   Problem Relation Name Age of Onset    Arthritis Mother      Hypertension Father      Stroke Father      Hypertension Brother      Esophageal cancer Son          Allergies:  Allergies   Allergen Reactions    Metformin Other    Statins-Hmg-Coa Reductase Inhibitors Other    Glimepiride Other, Diarrhea and Palpitations        Outpatient Medications:  Current Outpatient Medications   Medication Instructions    aspirin 81 mg, oral, Daily    cholecalciferol (Vitamin D3) 50 mcg (2,000 unit) capsule 1 capsule, oral, Daily    clopidogrel (PLAVIX) 75 mg, oral, Daily    isosorbide mononitrate ER (Imdur) 60 mg 24 hr tablet 1 tablet, oral, Daily    Jardiance 25 mg Take 1 tablet (25 mg) by mouth once daily.    lancets misc Use twice daily to check blood sugar    lisinopril 10 mg, oral, 2 times daily    metoprolol tartrate (LOPRESSOR) 25 mg, oral, 2 times daily    multivit-min/ferrous fumarate (MULTI VITAMIN ORAL) 1 tablet, oral, Daily    nitroglycerin (NITROSTAT) 0.4 mg, sublingual, Every 5 min PRN    OneTouch Ultra Test strip 2 strips, subcutaneous, Daily    OneTouch Ultra2 Meter misc TEST ONCE A DAY    Repatha SureClick 140 mg/mL injection ADMINISTER 140MG(1 INJECTION) UNDER THE SKIN EVERY 14 DAYS          REVIEW OF SYSTEMS  Review of Systems   All other systems reviewed and are negative.        VITALS  Vitals:    06/20/24 0834   BP: 104/62   Pulse: 70       PHYSICAL EXAM  Vitals and nursing note reviewed.   Constitutional:       Appearance: Normal appearance.   HENT:      Head: Normocephalic.   Neck:      Vascular: No JVD.  Carotid upstrokes II/IV.  Cardiovascular:      Rate and Rhythm: Normal rate and regular rhythm.      Pulses: Normal pulses.      Heart sounds: Normal S1 S2, no S3 S4.  No murmurs or rubs.  Pulmonary:      Effort: Pulmonary effort is normal. Respirations regular and nonlabored.     Breath sounds: Clear to auscultation posterior laterally.  Abdominal:      General: Bowel sounds are normal.      Palpations: Abdomen is soft.   Musculoskeletal:         General: Normal range of motion.      Cervical back: Normal range of motion.   Skin:     General: Skin is warm and dry.  Left subclavian pacemaker pocket is well-healed without redness swelling or drainage.  Neurological:      General: No focal deficit present.      Mental Status: Alert and oriented to person, place, and time.      Motor: Motor function is intact.   Psychiatric:         Attention and Perception: Attention and perception normal.         Mood and Affect: Mood and affect normal.         Speech: Speech normal.         Behavior: Behavior normal. Behavior is cooperative.         Thought Content: Thought content normal.         Cognition and Memory: Cognition and memory normal.     Labs and testing: Twelve-lead EKG reveals atrial pacing and ventricular tracking at 31 bpm, prolonged AV conduction with a CO interval of 308 ms.  QRS durations 92 ms,  ms, QTc 423 ms.  1 PVC is noted in the recording.  No acute ischemic changes are noted.  Device interrogation dated June 20, 2024 reveals atrial pacing 97.86% and ventricular pacing 0.11%.  No atrial or ventricular arrhythmic events were noted.  Good sensing and capture thresholds.  Estimated battery longevity is 8 years.      ASSESSMENT AND PLAN  Clinical impressions:  1. Sinus node dysfunction status post dual-chamber pacemaker implant ([id number] [place] XT DR MRI) on June 22, 2018.  2. Coronary artery disease status post remote angioplasty with Lexiscan stress test dated June 7, 2018 revealing moderate inferior  lateral infarct with no acute ischemic changes.  3. Left ventricular ejection fraction of 50-55% per 2D echo dated October 10, 2022.  4. Valvular heart disease consisting of 1+ MR and TR with mild increase in LV septal wall thickness, mild right ventricular enlargement, mild biatrial enlargement, right ventricular systolic pressure 31 mmHg, within the aortic root at 3.9 cm and ascending aorta at 3.7 cm.  5. Mild biatrial enlargement per 2D echocardiogram.  6. Hypertension, controlled with a blood pressure today 104/62.  7. Dyslipidemia on Repatha with history of intolerance of statins.  8. Class II obesity with a BMI 32.39.    Recommendations:  1.  Continue current medications as prescribed.  2.  Use of nitroglycerin was reviewed in detail with the patient.  He states his bottle of nitroglycerin does not need to be replaced.  He states that he just saw Dr. Baxter check on Monday and the medicine has not .  3.  Obtain a remote device interrogation on 2024 and in clinic check in 6 months prior to the office visit with Dr. Jasmine.  4.  Follow-up in office with Dr. Jasmine in 6 months or sooner if needed.  5.  Continue lifestyle modifications as discussed.    Evaluation and note by Ирина Haque CNP  **Please excuse any errors in grammar or translation related to this dictation.  Voice recognition software was utilized to prepare this document.**

## 2024-06-27 DIAGNOSIS — I48.91 ATRIAL FIBRILLATION, UNSPECIFIED TYPE (MULTI): ICD-10-CM

## 2024-06-27 RX ORDER — CLOPIDOGREL BISULFATE 75 MG/1
75 TABLET ORAL DAILY
Qty: 90 TABLET | Refills: 1 | Status: SHIPPED | OUTPATIENT
Start: 2024-06-27

## 2024-07-11 DIAGNOSIS — R07.89 ATYPICAL CHEST PAIN: ICD-10-CM

## 2024-07-11 RX ORDER — ISOSORBIDE MONONITRATE 60 MG/1
60 TABLET, EXTENDED RELEASE ORAL DAILY
Qty: 90 TABLET | Refills: 3 | Status: SHIPPED | OUTPATIENT
Start: 2024-07-11

## 2024-07-11 NOTE — TELEPHONE ENCOUNTER
Received request for prescription refills for patient.   Patient follows with Dr. Carlton Neil DO   Request is for refill  Is patient currently on medication yes    Last OV  6/20/24  Next OV 12/17/24    Pended for signing and sent to provider Dr. Casey Murphy M.D. in the absence of Dr. Carlton Neil DO

## 2024-07-19 ENCOUNTER — APPOINTMENT (OUTPATIENT)
Dept: PAIN MEDICINE | Facility: CLINIC | Age: 79
End: 2024-07-19
Payer: MEDICARE

## 2024-09-25 ENCOUNTER — HOSPITAL ENCOUNTER (OUTPATIENT)
Dept: CARDIOLOGY | Facility: HOSPITAL | Age: 79
Discharge: HOME | End: 2024-09-25
Payer: MEDICARE

## 2024-09-25 DIAGNOSIS — Z95.0 PACEMAKER: ICD-10-CM

## 2024-09-25 DIAGNOSIS — I49.5 SICK SINUS SYNDROME (MULTI): ICD-10-CM

## 2024-09-25 PROCEDURE — 93294 REM INTERROG EVL PM/LDLS PM: CPT | Performed by: INTERNAL MEDICINE

## 2024-09-25 PROCEDURE — 93296 REM INTERROG EVL PM/IDS: CPT

## 2024-10-15 ENCOUNTER — LAB (OUTPATIENT)
Dept: LAB | Facility: LAB | Age: 79
End: 2024-10-15

## 2024-10-15 DIAGNOSIS — Z12.5 PROSTATE CANCER SCREENING: ICD-10-CM

## 2024-10-15 DIAGNOSIS — E08.65 DIABETES MELLITUS DUE TO UNDERLYING CONDITION WITH HYPERGLYCEMIA, WITHOUT LONG-TERM CURRENT USE OF INSULIN: ICD-10-CM

## 2024-10-15 DIAGNOSIS — E78.2 HYPERLIPIDEMIA, MIXED: ICD-10-CM

## 2024-10-15 DIAGNOSIS — Z00.00 MEDICARE ANNUAL WELLNESS VISIT, SUBSEQUENT: ICD-10-CM

## 2024-10-15 DIAGNOSIS — E66.811 CLASS 1 OBESITY WITH BODY MASS INDEX (BMI) OF 32.0 TO 32.9 IN ADULT, UNSPECIFIED OBESITY TYPE, UNSPECIFIED WHETHER SERIOUS COMORBIDITY PRESENT: ICD-10-CM

## 2024-10-15 LAB
ALBUMIN SERPL BCP-MCNC: 4.2 G/DL (ref 3.4–5)
ALP SERPL-CCNC: 48 U/L (ref 33–136)
ALT SERPL W P-5'-P-CCNC: 11 U/L (ref 10–52)
ANION GAP SERPL CALC-SCNC: 11 MMOL/L (ref 10–20)
AST SERPL W P-5'-P-CCNC: 14 U/L (ref 9–39)
BILIRUB SERPL-MCNC: 0.7 MG/DL (ref 0–1.2)
BUN SERPL-MCNC: 18 MG/DL (ref 6–23)
CALCIUM SERPL-MCNC: 9.6 MG/DL (ref 8.6–10.3)
CHLORIDE SERPL-SCNC: 105 MMOL/L (ref 98–107)
CHOLEST SERPL-MCNC: 149 MG/DL (ref 0–199)
CHOLESTEROL/HDL RATIO: 3.6
CO2 SERPL-SCNC: 28 MMOL/L (ref 21–32)
CREAT SERPL-MCNC: 0.88 MG/DL (ref 0.5–1.3)
EGFRCR SERPLBLD CKD-EPI 2021: 88 ML/MIN/1.73M*2
EST. AVERAGE GLUCOSE BLD GHB EST-MCNC: 146 MG/DL
GLUCOSE SERPL-MCNC: 104 MG/DL (ref 74–99)
HBA1C MFR BLD: 6.7 %
HDLC SERPL-MCNC: 41.7 MG/DL
LDLC SERPL CALC-MCNC: 59 MG/DL
NON HDL CHOLESTEROL: 107 MG/DL (ref 0–149)
POTASSIUM SERPL-SCNC: 4.9 MMOL/L (ref 3.5–5.3)
PROT SERPL-MCNC: 7.1 G/DL (ref 6.4–8.2)
PSA SERPL-MCNC: 2.83 NG/ML
SODIUM SERPL-SCNC: 139 MMOL/L (ref 136–145)
TRIGL SERPL-MCNC: 242 MG/DL (ref 0–149)
VLDL: 48 MG/DL (ref 0–40)

## 2024-10-15 PROCEDURE — 83036 HEMOGLOBIN GLYCOSYLATED A1C: CPT

## 2024-10-15 PROCEDURE — G0103 PSA SCREENING: HCPCS

## 2024-10-15 PROCEDURE — 36415 COLL VENOUS BLD VENIPUNCTURE: CPT

## 2024-10-15 PROCEDURE — 80053 COMPREHEN METABOLIC PANEL: CPT

## 2024-10-15 PROCEDURE — 80061 LIPID PANEL: CPT

## 2024-10-17 ENCOUNTER — APPOINTMENT (OUTPATIENT)
Dept: PRIMARY CARE | Facility: CLINIC | Age: 79
End: 2024-10-17
Payer: MEDICARE

## 2024-10-21 ENCOUNTER — APPOINTMENT (OUTPATIENT)
Dept: PRIMARY CARE | Facility: CLINIC | Age: 79
End: 2024-10-21
Payer: MEDICARE

## 2024-10-21 VITALS
SYSTOLIC BLOOD PRESSURE: 122 MMHG | HEART RATE: 65 BPM | TEMPERATURE: 97.5 F | BODY MASS INDEX: 32.17 KG/M2 | DIASTOLIC BLOOD PRESSURE: 69 MMHG | HEIGHT: 71 IN | WEIGHT: 229.8 LBS

## 2024-10-21 DIAGNOSIS — E11.9 TYPE 2 DIABETES MELLITUS WITHOUT COMPLICATION, WITHOUT LONG-TERM CURRENT USE OF INSULIN (MULTI): Primary | ICD-10-CM

## 2024-10-21 DIAGNOSIS — E78.2 HYPERLIPIDEMIA, MIXED: ICD-10-CM

## 2024-10-21 DIAGNOSIS — I10 BENIGN ESSENTIAL HYPERTENSION: ICD-10-CM

## 2024-10-21 DIAGNOSIS — Z23 NEED FOR INFLUENZA VACCINATION: ICD-10-CM

## 2024-10-21 PROCEDURE — 3074F SYST BP LT 130 MM HG: CPT | Performed by: FAMILY MEDICINE

## 2024-10-21 PROCEDURE — 1158F ADVNC CARE PLAN TLK DOCD: CPT | Performed by: FAMILY MEDICINE

## 2024-10-21 PROCEDURE — 3078F DIAST BP <80 MM HG: CPT | Performed by: FAMILY MEDICINE

## 2024-10-21 PROCEDURE — 99213 OFFICE O/P EST LOW 20 MIN: CPT | Performed by: FAMILY MEDICINE

## 2024-10-21 PROCEDURE — 1160F RVW MEDS BY RX/DR IN RCRD: CPT | Performed by: FAMILY MEDICINE

## 2024-10-21 PROCEDURE — 1036F TOBACCO NON-USER: CPT | Performed by: FAMILY MEDICINE

## 2024-10-21 PROCEDURE — G0008 ADMIN INFLUENZA VIRUS VAC: HCPCS | Performed by: FAMILY MEDICINE

## 2024-10-21 PROCEDURE — 1157F ADVNC CARE PLAN IN RCRD: CPT | Performed by: FAMILY MEDICINE

## 2024-10-21 PROCEDURE — 90662 IIV NO PRSV INCREASED AG IM: CPT | Performed by: FAMILY MEDICINE

## 2024-10-21 PROCEDURE — 1159F MED LIST DOCD IN RCRD: CPT | Performed by: FAMILY MEDICINE

## 2024-10-21 PROCEDURE — 1123F ACP DISCUSS/DSCN MKR DOCD: CPT | Performed by: FAMILY MEDICINE

## 2024-10-21 ASSESSMENT — ENCOUNTER SYMPTOMS
ALLERGIC/IMMUNOLOGIC NEGATIVE: 1
GASTROINTESTINAL NEGATIVE: 1
CARDIOVASCULAR NEGATIVE: 1
RESPIRATORY NEGATIVE: 1
CONSTITUTIONAL NEGATIVE: 1
EYES NEGATIVE: 1
PSYCHIATRIC NEGATIVE: 1
ENDOCRINE NEGATIVE: 1
HEMATOLOGIC/LYMPHATIC NEGATIVE: 1
MUSCULOSKELETAL NEGATIVE: 1

## 2024-10-21 ASSESSMENT — PATIENT HEALTH QUESTIONNAIRE - PHQ9
1. LITTLE INTEREST OR PLEASURE IN DOING THINGS: NOT AT ALL
2. FEELING DOWN, DEPRESSED OR HOPELESS: NOT AT ALL
SUM OF ALL RESPONSES TO PHQ9 QUESTIONS 1 AND 2: 0

## 2024-10-21 NOTE — PROGRESS NOTES
Subjective and is here for a follow-up on his diabetes.  He states that he has been feeling well.  His only complaint is that of a rash on his right arm that began about 5 days ago.  At that time he had been working in his yard and may have been exposed to poison ivy.  It has been slightly itchy.  He continues on his medications noted.  He sees his cardiologist on a regular basis.    Patient ID: Juan Loza is a 79 y.o. male who presents for Follow-up (Flu vaccine and paperwork for diabetic footwear):    Problem List Items Addressed This Visit       Benign essential hypertension    Hyperlipidemia, mixed    Diabetes mellitus (Multi) - Primary     Other Visit Diagnoses       Need for influenza vaccination        Relevant Orders    Flu vaccine, trivalent, preservative free, HIGH-DOSE, age 65y+ (Fluzone) (Completed)           Past Medical History:   Diagnosis Date    Encounter for immunization 11/24/2021    Encounter for immunization    Ingrowing nail 03/03/2022    Ingrown toenail    Obesity, unspecified 05/02/2022    Class 2 obesity with body mass index (BMI) of 36.0 to 36.9 in adult    Obesity, unspecified 10/17/2022    Class 2 obesity with body mass index (BMI) of 37.0 to 37.9 in adult    Obesity, unspecified 07/27/2022    Class 2 obesity with body mass index (BMI) of 35.0 to 35.9 in adult    Obesity, unspecified 04/12/2022    Class 2 obesity with body mass index (BMI) of 35.0 to 35.9 in adult    Other acute postprocedural pain 05/09/2022    Post-op pain    Other conditions influencing health status 11/14/2020    Postoperative bleeding from mouth    Personal history of other diseases of the digestive system 11/14/2020    History of oral hemorrhage    Personal history of other diseases of the musculoskeletal system and connective tissue 06/15/2022    History of neck pain    Personal history of other specified conditions 05/02/2022    History of fatigue    Personal history of other specified conditions 05/02/2022     History of abdominal pain    Stiffness of right hand, not elsewhere classified 07/06/2022    Stiffness of finger joint of right hand      Past Surgical History:   Procedure Laterality Date    OTHER SURGICAL HISTORY  11/24/2021    Hip replacement    OTHER SURGICAL HISTORY  11/24/2021    Wrist surgery    OTHER SURGICAL HISTORY  11/24/2021    Percutaneous transluminal coronary angioplasty    OTHER SURGICAL HISTORY  11/24/2021    Pacemaker insertion    OTHER SURGICAL HISTORY  11/24/2021    Appendectomy    OTHER SURGICAL HISTORY  11/24/2021    Back surgery    OTHER SURGICAL HISTORY  11/24/2021    Esophagogastroduodenoscopy    OTHER SURGICAL HISTORY  11/24/2021    Tonsillectomy    OTHER SURGICAL HISTORY  11/24/2021    Varicose vein sclerotherapy    OTHER SURGICAL HISTORY  12/09/2021    Complete colonoscopy    OTHER SURGICAL HISTORY  12/15/2021    Cataract surgery    OTHER SURGICAL HISTORY  06/29/2022    Circumcision      Family History   Problem Relation Name Age of Onset    Arthritis Mother      Hypertension Father      Stroke Father      Hypertension Brother      Esophageal cancer Son      Leukemia Mother's Sister        Social History     Socioeconomic History    Marital status:      Spouse name: Not on file    Number of children: Not on file    Years of education: Not on file    Highest education level: Not on file   Occupational History    Not on file   Tobacco Use    Smoking status: Never     Passive exposure: Never    Smokeless tobacco: Never   Vaping Use    Vaping status: Never Used   Substance and Sexual Activity    Alcohol use: Yes     Comment: 3 beers weekly    Drug use: Never    Sexual activity: Defer   Other Topics Concern    Not on file   Social History Narrative    Not on file     Social Drivers of Health     Financial Resource Strain: Not on file   Food Insecurity: Not on file   Transportation Needs: Not on file   Physical Activity: Not on file   Stress: Not on file   Social Connections: Not on file    Intimate Partner Violence: Not on file   Housing Stability: Not on file      Metformin, Statins-hmg-coa reductase inhibitors, and Glimepiride   Current Outpatient Medications   Medication Sig Dispense Refill    aspirin 81 mg EC tablet Take 1 tablet (81 mg) by mouth once daily.      cholecalciferol (Vitamin D3) 50 mcg (2,000 unit) capsule Take 1 capsule (50 mcg) by mouth once daily.      clopidogrel (Plavix) 75 mg tablet TAKE ONE TABLET BY MOUTH DAILY 90 tablet 1    isosorbide mononitrate ER (Imdur) 60 mg 24 hr tablet TAKE ONE TABLET BY MOUTH DAILY 90 tablet 3    Jardiance 25 mg Take 1 tablet (25 mg) by mouth once daily.      lancets misc Use twice daily to check blood sugar 200 each 3    lisinopril 10 mg tablet TAKE ONE TABLET BY MOUTH TWO TIMES A  tablet 3    metoprolol tartrate (Lopressor) 25 mg tablet TAKE ONE TABLET BY MOUTH TWO TIMES A  tablet 3    multivit-min/ferrous fumarate (MULTI VITAMIN ORAL) Take 1 tablet by mouth once daily.      nitroglycerin (Nitrostat) 0.4 mg SL tablet Place 1 tablet (0.4 mg) under the tongue every 5 minutes if needed for chest pain (FOR UP TO 3 DOSES AS NEEDED FOR CHEST PAIN.). 25 tablet 5    OneTouch Ultra Test strip Inject 2 strips under the skin once daily. 200 strip 3    OneTouch Ultra2 Meter misc TEST ONCE A DAY      Repatha SureClick 140 mg/mL injection ADMINISTER 140MG(1 INJECTION) UNDER THE SKIN EVERY 14 DAYS 6 mL 3     No current facility-administered medications for this visit.       Immunization History   Administered Date(s) Administered    Flu vaccine, trivalent, preservative free, HIGH-DOSE, age 65y+ (Fluzone) 10/12/2015, 10/24/2017, 11/16/2018, 10/01/2019, 09/16/2020, 10/19/2021, 09/28/2022, 10/21/2024    Flu vaccine, trivalent, preservative free, age 6 months and greater (Fluarix/Fluzone/Flulaval) 11/26/2012    Influenza, Unspecified 10/01/2014, 11/01/2015, 09/16/2016    Influenza, seasonal, injectable 09/23/2014    Moderna SARS-CoV-2 Vaccination  02/17/2021, 03/17/2021, 10/25/2021    Pneumococcal conjugate vaccine, 13-valent (PREVNAR 13) 11/30/2015, 10/28/2016    Pneumococcal polysaccharide vaccine, 23-valent, age 2 years and older (PNEUMOVAX 23) 03/21/2014, 11/17/2015    Zoster, Unspecified 08/31/2020, 11/16/2020    Zoster, live 01/01/2013        Review of Systems   Constitutional: Negative.    HENT: Negative.     Eyes: Negative.    Respiratory: Negative.     Cardiovascular: Negative.    Gastrointestinal: Negative.    Endocrine: Negative.    Genitourinary: Negative.    Musculoskeletal: Negative.    Skin:  Positive for rash.   Allergic/Immunologic: Negative.    Hematological: Negative.    Psychiatric/Behavioral: Negative.     All other systems reviewed and are negative.       Vitals:    10/21/24 1120   BP: 122/69   Pulse: 65   Temp: 36.4 °C (97.5 °F)     Vitals:    10/21/24 1120   Weight: 104 kg (229 lb 12.8 oz)      Physical Exam  Constitutional:       General: He is not in acute distress.     Appearance: Normal appearance.   Cardiovascular:      Rate and Rhythm: Normal rate and regular rhythm.      Pulses: Normal pulses.      Heart sounds: Normal heart sounds. No murmur heard.     No friction rub. No gallop.   Pulmonary:      Effort: Pulmonary effort is normal. No respiratory distress.      Breath sounds: Normal breath sounds. No wheezing or rales.   Musculoskeletal:      Cervical back: Neck supple.   Neurological:      Mental Status: He is alert.     Right forearm.  On the distal volar aspect there is splotchy erythema with several linear lesions.  There are no pustules or vesicles.  There is no swelling or drainage.    ASSESSMENT/PLAN: Diabetes mellitus type 2 stable with A1c 6.7.  Continue current meds noted.  Continue to follow diabetic diet and exercise.  Check CMP and A1c in 4 months    Hyperlipidemia stable with cholesterol 149 and LDL 68.  Patient is continuing Repatha under the direction of his cardiologist.    Hypertension.  Stable.  Continue  meds as noted.    Coronary artery disease followed by cardiology.    Contact dermatitis right forearm.  Use hydrocortisone cream 1% twice daily and call if any worsening.  We discussed poison ivy avoidance precautions.    Follow-up in 4 months and call as needed

## 2024-11-01 DIAGNOSIS — I10 BENIGN ESSENTIAL HYPERTENSION: ICD-10-CM

## 2024-11-01 RX ORDER — METOPROLOL TARTRATE 25 MG/1
25 TABLET, FILM COATED ORAL 2 TIMES DAILY
Qty: 180 TABLET | Refills: 3 | Status: SHIPPED | OUTPATIENT
Start: 2024-11-01

## 2024-12-17 ENCOUNTER — APPOINTMENT (OUTPATIENT)
Dept: CARDIOLOGY | Facility: CLINIC | Age: 79
End: 2024-12-17
Payer: MEDICARE

## 2024-12-17 VITALS
BODY MASS INDEX: 32.66 KG/M2 | HEIGHT: 71 IN | DIASTOLIC BLOOD PRESSURE: 60 MMHG | WEIGHT: 233.3 LBS | HEART RATE: 72 BPM | SYSTOLIC BLOOD PRESSURE: 112 MMHG

## 2024-12-17 DIAGNOSIS — Z98.61 CAD S/P PERCUTANEOUS CORONARY ANGIOPLASTY: ICD-10-CM

## 2024-12-17 DIAGNOSIS — Z78.9 NEVER SMOKED CIGARETTES: ICD-10-CM

## 2024-12-17 DIAGNOSIS — I25.10 CAD S/P PERCUTANEOUS CORONARY ANGIOPLASTY: ICD-10-CM

## 2024-12-17 DIAGNOSIS — I10 BENIGN ESSENTIAL HYPERTENSION: ICD-10-CM

## 2024-12-17 DIAGNOSIS — E78.2 HYPERLIPIDEMIA, MIXED: ICD-10-CM

## 2024-12-17 DIAGNOSIS — I21.4 NON-ST ELEVATED MYOCARDIAL INFARCTION (MULTI): ICD-10-CM

## 2024-12-17 DIAGNOSIS — Z95.0 CARDIAC PACEMAKER IN SITU: ICD-10-CM

## 2024-12-17 DIAGNOSIS — I25.5 ISCHEMIC CARDIOMYOPATHY: ICD-10-CM

## 2024-12-17 DIAGNOSIS — I48.91 ATRIAL FIBRILLATION, UNSPECIFIED TYPE (MULTI): ICD-10-CM

## 2024-12-17 PROCEDURE — 1159F MED LIST DOCD IN RCRD: CPT | Performed by: INTERNAL MEDICINE

## 2024-12-17 PROCEDURE — 1123F ACP DISCUSS/DSCN MKR DOCD: CPT | Performed by: INTERNAL MEDICINE

## 2024-12-17 PROCEDURE — 3078F DIAST BP <80 MM HG: CPT | Performed by: INTERNAL MEDICINE

## 2024-12-17 PROCEDURE — 3074F SYST BP LT 130 MM HG: CPT | Performed by: INTERNAL MEDICINE

## 2024-12-17 PROCEDURE — 1036F TOBACCO NON-USER: CPT | Performed by: INTERNAL MEDICINE

## 2024-12-17 PROCEDURE — 1157F ADVNC CARE PLAN IN RCRD: CPT | Performed by: INTERNAL MEDICINE

## 2024-12-17 PROCEDURE — 99215 OFFICE O/P EST HI 40 MIN: CPT | Performed by: INTERNAL MEDICINE

## 2024-12-17 NOTE — PATIENT INSTRUCTIONS
Continue same medications/treatment.  Patient educated on proper medication use.  Patient educated on risk factor modification.  Please bring any lab results from other providers/physicians to your next appointment.    Please bring all medicines, vitamins, and herbal supplements with you when you come to the office.    Prescriptions will not be filled unless you are compliant with your follow up appointments or have a follow up appointment scheduled as per instruction of your physician. Refills should be requested at the time of your visit.    Follow up in 6 months  ECHO and Lexiscan stress test    I, LARA VENEGAS RN, AM SCRIBING FOR AND IN THE PRESENCE OF DR. PANTERA JONES, DO, FACC

## 2024-12-17 NOTE — PROGRESS NOTES
Patient:  Juan Loza  YOB: 1945  MRN: [id number]       Chief Complaint/Active Symptoms:       Juan Loza is a 79 y.o. male who returns today for cardiac follow-up.  Patient has a history of coronary disease angioplasty and stenting.  He also has a pacemaker.  He has hyperlipidemia but is intolerant to statins so he is on Repatha.  Has had atrial fibrillation in the past and cardiomyopathy.  [facility] and [facility].  This includes Dr. Ingram handles his hypertension diabetes and things of that nature.  He denies any new unusual symptoms problems or concern at this time      Objective:     Vitals:    12/17/24 1317   BP: 112/60   Pulse: 72       Vitals:    12/17/24 1317   Weight: 106 kg (233 lb 4.8 oz)       Allergies:     Allergies   Allergen Reactions    Metformin Other    Statins-Hmg-Coa Reductase Inhibitors Other    Glimepiride Other, Diarrhea and Palpitations          Medications:     Current Outpatient Medications   Medication Instructions    aspirin 81 mg, oral, Daily    cholecalciferol (Vitamin D3) 50 mcg (2,000 unit) capsule 1 capsule, Daily    clopidogrel (PLAVIX) 75 mg, oral, Daily    isosorbide mononitrate ER (IMDUR) 60 mg, oral, Daily    Jardiance 25 mg Take 1 tablet (25 mg) by mouth once daily.    lancets misc Use twice daily to check blood sugar    lisinopril 10 mg, oral, 2 times daily    metoprolol tartrate (LOPRESSOR) 25 mg, oral, 2 times daily    multivit-min/ferrous fumarate (MULTI VITAMIN ORAL) 1 tablet, Daily    nitroglycerin (NITROSTAT) 0.4 mg, sublingual, Every 5 min PRN    OneTouch Ultra Test strip 2 strips, subcutaneous, Daily    OneTouch Ultra2 Meter misc TEST ONCE A DAY    Repatha SureClick 140 mg/mL injection ADMINISTER 140MG(1 INJECTION) UNDER THE SKIN EVERY 14 DAYS       Physical Examination:   Constitutional:       Appearance: Healthy appearance. Not in distress.   Neck:      Vascular: No JVR. JVD normal.   Pulmonary:      Effort:  "Pulmonary effort is normal.      Breath sounds: Normal breath sounds. No wheezing. No rhonchi. No rales.   Chest:      Chest wall: Not tender to palpatation.   Cardiovascular:      PMI at left midclavicular line. Normal rate. Regular rhythm. Normal S1. Normal S2.       Murmurs: There is no murmur.      No gallop.  No click. No rub.   Pulses:     Intact distal pulses.   Edema:     Peripheral edema absent.   Abdominal:      General: Bowel sounds are normal.      Palpations: Abdomen is soft.      Tenderness: There is no abdominal tenderness.   Musculoskeletal: Normal range of motion.         General: No tenderness. Skin:     General: Skin is warm and dry.   Neurological:      General: No focal deficit present.      Mental Status: Alert and oriented to person, place and time.            Lab:     CBC:   Lab Results   Component Value Date    WBC 6.2 09/28/2023    RBC 5.08 09/28/2023    HGB 15.6 09/28/2023    HCT 47.6 09/28/2023     09/28/2023        CMP:    Lab Results   Component Value Date     10/15/2024    K 4.9 10/15/2024     10/15/2024    CO2 28 10/15/2024    BUN 18 10/15/2024    CREATININE 0.88 10/15/2024    GLUCOSE 104 (H) 10/15/2024    CALCIUM 9.6 10/15/2024       Magnesium:    No results found for: \"MG\"    Lipid Profile:    Lab Results   Component Value Date    TRIG 242 (H) 10/15/2024    HDL 41.7 10/15/2024    LDLCALC 59 10/15/2024       TSH:    Lab Results   Component Value Date    TSH 0.45 05/02/2022       BNP:   No results found for: \"BNP\"     PT/INR:    No results found for: \"PROTIME\", \"INR\"    HgBA1c:    Lab Results   Component Value Date    HGBA1C 6.7 (H) 10/15/2024       BMP:  Lab Results   Component Value Date     10/15/2024     06/06/2024     02/01/2024    K 4.9 10/15/2024    K 4.9 06/06/2024    K 4.6 02/01/2024     10/15/2024     06/06/2024     02/01/2024    CO2 28 10/15/2024    CO2 28 06/06/2024    CO2 29 02/01/2024    BUN 18 10/15/2024    BUN 20 " "06/06/2024    BUN 16 02/01/2024    CREATININE 0.88 10/15/2024    CREATININE 0.91 06/06/2024    CREATININE 0.98 02/01/2024       CBC:  Lab Results   Component Value Date    WBC 6.2 09/28/2023    WBC 6.2 05/02/2022    WBC 7.0 04/07/2022    RBC 5.08 09/28/2023    RBC 5.09 05/02/2022    RBC 5.27 04/07/2022    HGB 15.6 09/28/2023    HGB 15.7 05/02/2022    HGB 16.2 04/07/2022    HCT 47.6 09/28/2023    HCT 49.5 05/02/2022    HCT 50.5 04/07/2022    MCV 94 09/28/2023    MCV 97 05/02/2022    MCV 96 04/07/2022    MCHC 32.8 09/28/2023    MCHC 31.7 (L) 05/02/2022    MCHC 32.1 04/07/2022    RDW 13.1 09/28/2023    RDW 12.9 05/02/2022    RDW 13.8 04/07/2022     09/28/2023     05/02/2022     04/07/2022       Cardiac Enzymes:    No results found for: \"TROPHS\"    Hepatic Function Panel:    Lab Results   Component Value Date    ALKPHOS 48 10/15/2024    ALT 11 10/15/2024    AST 14 10/15/2024    PROT 7.1 10/15/2024    BILITOT 0.7 10/15/2024         Diagnostic Studies:     No results found.    EKG:   No results found for: \"EKG\"      Radiology:     No orders to display       Assessment/Plan:         Patient Active Problem List   Diagnosis    Anxiety    Arthralgia    Atypical chest pain    Benign essential hypertension    CAD S/P percutaneous coronary angioplasty    Cardiac pacemaker in situ    Chronic peripheral venous hypertension    Depression    DJD (degenerative joint disease)    Fibrillation, atrial (Multi)    Hyperlipidemia, mixed    Ischemic cardiomyopathy    Lesion of vertebra    Lower urinary tract symptoms (LUTS)    Lumbar radiculopathy, chronic    Non-ST elevated myocardial infarction (Multi)    Nontraumatic incomplete tear of right rotator cuff    Osteoarthritis, hand    Phimosis    Pulmonary nodule    Rising PSA level    Shoulder pain    Tinnitus of both ears    Trigger finger    Never smoked cigarettes    Diabetes mellitus (Multi)    Medicare annual wellness visit, subsequent    Class 1 obesity with body " mass index (BMI) of 32.0 to 32.9 in adult         ASSESSMENT       79-year-old gentleman here for routine cardiovascular follow-up    Meds vitals and exam are as noted.    Chart review detail discussed with the patient at length.    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

## 2024-12-22 DIAGNOSIS — I48.91 ATRIAL FIBRILLATION, UNSPECIFIED TYPE (MULTI): ICD-10-CM

## 2024-12-24 DIAGNOSIS — E78.2 HYPERLIPIDEMIA, MIXED: ICD-10-CM

## 2024-12-26 RX ORDER — CLOPIDOGREL BISULFATE 75 MG/1
75 TABLET ORAL DAILY
Qty: 90 TABLET | Refills: 1 | Status: SHIPPED | OUTPATIENT
Start: 2024-12-26

## 2024-12-26 NOTE — TELEPHONE ENCOUNTER
Received request for prescription refills for patient.   Patient follows with Dr. Neil    Request is for Repatha  Is patient currently on medication yes    Last OV 12/17/24  Next OV 6/24/25    Pended for signing and sent to provider

## 2024-12-27 RX ORDER — EVOLOCUMAB 140 MG/ML
INJECTION, SOLUTION SUBCUTANEOUS
Qty: 6 ML | Refills: 3 | Status: SHIPPED | OUTPATIENT
Start: 2024-12-27

## 2025-01-03 DIAGNOSIS — I10 BENIGN ESSENTIAL HYPERTENSION: ICD-10-CM

## 2025-01-03 RX ORDER — LISINOPRIL 10 MG/1
10 TABLET ORAL 2 TIMES DAILY
Qty: 180 TABLET | Refills: 0 | Status: SHIPPED | OUTPATIENT
Start: 2025-01-03

## 2025-01-08 ENCOUNTER — HOSPITAL ENCOUNTER (OUTPATIENT)
Dept: CARDIOLOGY | Facility: HOSPITAL | Age: 80
Discharge: HOME | End: 2025-01-08
Payer: MEDICARE

## 2025-01-08 ENCOUNTER — APPOINTMENT (OUTPATIENT)
Dept: CARDIOLOGY | Facility: CLINIC | Age: 80
End: 2025-01-08
Payer: MEDICARE

## 2025-01-08 VITALS
WEIGHT: 225 LBS | HEART RATE: 67 BPM | SYSTOLIC BLOOD PRESSURE: 110 MMHG | HEIGHT: 70 IN | BODY MASS INDEX: 32.21 KG/M2 | DIASTOLIC BLOOD PRESSURE: 60 MMHG

## 2025-01-08 DIAGNOSIS — E66.811 CLASS 1 OBESITY WITHOUT SERIOUS COMORBIDITY WITH BODY MASS INDEX (BMI) OF 32.0 TO 32.9 IN ADULT, UNSPECIFIED OBESITY TYPE: ICD-10-CM

## 2025-01-08 DIAGNOSIS — I48.91 ATRIAL FIBRILLATION, UNSPECIFIED TYPE (MULTI): Primary | ICD-10-CM

## 2025-01-08 DIAGNOSIS — Z95.0 CARDIAC PACEMAKER IN SITU: ICD-10-CM

## 2025-01-08 DIAGNOSIS — I49.5 SICK SINUS SYNDROME (MULTI): ICD-10-CM

## 2025-01-08 DIAGNOSIS — I48.91 ATRIAL FIBRILLATION, UNSPECIFIED TYPE (MULTI): ICD-10-CM

## 2025-01-08 DIAGNOSIS — Z78.9 NEVER SMOKED CIGARETTES: ICD-10-CM

## 2025-01-08 DIAGNOSIS — I10 BENIGN ESSENTIAL HYPERTENSION: ICD-10-CM

## 2025-01-08 PROCEDURE — 99214 OFFICE O/P EST MOD 30 MIN: CPT | Performed by: INTERNAL MEDICINE

## 2025-01-08 PROCEDURE — 1036F TOBACCO NON-USER: CPT | Performed by: INTERNAL MEDICINE

## 2025-01-08 PROCEDURE — 3074F SYST BP LT 130 MM HG: CPT | Performed by: INTERNAL MEDICINE

## 2025-01-08 PROCEDURE — 93000 ELECTROCARDIOGRAM COMPLETE: CPT | Mod: DISTINCT PROCEDURAL SERVICE | Performed by: INTERNAL MEDICINE

## 2025-01-08 PROCEDURE — 1157F ADVNC CARE PLAN IN RCRD: CPT | Performed by: INTERNAL MEDICINE

## 2025-01-08 PROCEDURE — 1159F MED LIST DOCD IN RCRD: CPT | Performed by: INTERNAL MEDICINE

## 2025-01-08 PROCEDURE — 93280 PM DEVICE PROGR EVAL DUAL: CPT

## 2025-01-08 PROCEDURE — 1123F ACP DISCUSS/DSCN MKR DOCD: CPT | Performed by: INTERNAL MEDICINE

## 2025-01-08 PROCEDURE — 93280 PM DEVICE PROGR EVAL DUAL: CPT | Performed by: INTERNAL MEDICINE

## 2025-01-08 PROCEDURE — 3078F DIAST BP <80 MM HG: CPT | Performed by: INTERNAL MEDICINE

## 2025-01-08 ASSESSMENT — ENCOUNTER SYMPTOMS
PND: 0
SYNCOPE: 0
ORTHOPNEA: 0
NEAR-SYNCOPE: 0
WHEEZING: 0
IRREGULAR HEARTBEAT: 0
SHORTNESS OF BREATH: 0
COUGH: 0
SNORING: 0
CARDIOVASCULAR NEGATIVE: 1
CLAUDICATION: 0

## 2025-01-08 NOTE — PROGRESS NOTES
CARDIOLOGY OFFICE VISIT      CHIEF COMPLAINT  Chief Complaint   Patient presents with    Follow-up     Pt is here today following up after 6 months with device check        HISTORY OF PRESENT ILLNESS  HPI  79-year-old  male who is followed for sinus node dysfunction status post dual-chamber pacemaker implant on June 22, 2018, coronary artery disease status post remote angioplasty, valvular heart disease, hypertension, dyslipidemia with intolerance to statins.    Since the last visit he has been doing well.  He denies any symptoms of chest pain or shortness breath or palpitations.    EKG performed today shows atrial paced rhythm at a rate of 67 bpm QRS ration 92 ms QT corrected 412 ms.  Rhythm strip shows the same pattern.    Patient had a device interrogation today at the device clinic and it shows dual-chamber pacemaker Medtronic with battery Ingevity 7 years a month.  No evidence of atrial or ventricular arrhythmias.      Past Medical History  Past Medical History:   Diagnosis Date    Encounter for immunization 11/24/2021    Encounter for immunization    Ingrowing nail 03/03/2022    Ingrown toenail    Obesity, unspecified 05/02/2022    Class 2 obesity with body mass index (BMI) of 36.0 to 36.9 in adult    Obesity, unspecified 10/17/2022    Class 2 obesity with body mass index (BMI) of 37.0 to 37.9 in adult    Obesity, unspecified 07/27/2022    Class 2 obesity with body mass index (BMI) of 35.0 to 35.9 in adult    Obesity, unspecified 04/12/2022    Class 2 obesity with body mass index (BMI) of 35.0 to 35.9 in adult    Other acute postprocedural pain 05/09/2022    Post-op pain    Other conditions influencing health status 11/14/2020    Postoperative bleeding from mouth    Personal history of other diseases of the digestive system 11/14/2020    History of oral hemorrhage    Personal history of other diseases of the musculoskeletal system and connective tissue 06/15/2022    History of neck pain    Personal  history of other specified conditions 05/02/2022    History of fatigue    Personal history of other specified conditions 05/02/2022    History of abdominal pain    Stiffness of right hand, not elsewhere classified 07/06/2022    Stiffness of finger joint of right hand       Social History  Social History     Tobacco Use    Smoking status: Never     Passive exposure: Never    Smokeless tobacco: Never   Vaping Use    Vaping status: Never Used   Substance Use Topics    Alcohol use: Yes     Comment: 3 beers weekly    Drug use: Never       Family History     Family History   Problem Relation Name Age of Onset    Arthritis Mother      Hypertension Father      Stroke Father      Hypertension Brother      Esophageal cancer Son      Leukemia Mother's Sister          Allergies:  Allergies   Allergen Reactions    Metformin Other    Statins-Hmg-Coa Reductase Inhibitors Other    Glimepiride Other, Diarrhea and Palpitations        Outpatient Medications:  Current Outpatient Medications   Medication Instructions    aspirin 81 mg, oral, Daily    cholecalciferol (Vitamin D3) 50 mcg (2,000 unit) capsule 1 capsule, Daily    clopidogrel (PLAVIX) 75 mg, oral, Daily    isosorbide mononitrate ER (IMDUR) 60 mg, oral, Daily    Jardiance 25 mg Take 1 tablet (25 mg) by mouth once daily.    lancets misc Use twice daily to check blood sugar    lisinopril 10 mg, oral, 2 times daily    metoprolol tartrate (LOPRESSOR) 25 mg, oral, 2 times daily    multivit-min/ferrous fumarate (MULTI VITAMIN ORAL) 1 tablet, Daily    nitroglycerin (NITROSTAT) 0.4 mg, sublingual, Every 5 min PRN    OneTouch Ultra Test strip 2 strips, subcutaneous, Daily    OneTouch Ultra2 Meter misc TEST ONCE A DAY    Repatha SureClick 140 mg/mL injection ADMINISTER 140MG(1 INJECTION) UNDER THE SKIN EVERY 14 DAYS          REVIEW OF SYSTEMS  Review of Systems   Constitutional: Negative for malaise/fatigue.   Cardiovascular: Negative.  Negative for claudication, cyanosis, irregular  heartbeat, leg swelling, near-syncope, orthopnea, paroxysmal nocturnal dyspnea and syncope.   Respiratory:  Negative for cough, shortness of breath, snoring and wheezing.    All other systems reviewed and are negative.        VITALS  There were no vitals filed for this visit.    PHYSICAL EXAM  Constitutional:       Appearance: Normal and healthy appearance. Well-developed and not in distress. Obese.      Comments: Left sided device well healed   Neck:      Vascular: No JVR. JVD normal.   Pulmonary:      Effort: Pulmonary effort is normal.      Breath sounds: Normal breath sounds. No wheezing. No rhonchi. No rales.   Chest:      Chest wall: Not tender to palpatation.   Cardiovascular:      PMI at left midclavicular line. Normal rate. Regular rhythm. Normal S1. Normal S2.       Murmurs: There is no murmur.      No gallop.  No click. No rub.   Pulses:     Intact distal pulses.   Edema:     Peripheral edema absent.   Abdominal:      Tenderness: There is no abdominal tenderness.   Musculoskeletal: Normal range of motion.         General: No tenderness. Skin:     General: Skin is warm and dry.   Neurological:      General: No focal deficit present.      Mental Status: Alert and oriented to person, place and time.           ASSESSMENT AND PLAN  Clinical impressions:  1. Sinus node dysfunction status post dual-chamber pacemaker implant (Medtronic Julia XT DR MRI) on June 22, 2018.  2. Coronary artery disease status post remote angioplasty with Lexiscan stress test dated June 7, 2018 revealing moderate inferior lateral infarct with no acute ischemic changes.  3. Left ventricular ejection fraction of 50-55% per 2D echo dated October 10, 2022.  4. Valvular heart disease consisting of 1+ MR and TR with mild increase in LV septal wall thickness, mild right ventricular enlargement, mild biatrial enlargement, right ventricular systolic pressure 31 mmHg, within the aortic root at 3.9 cm and ascending aorta at 3.7 cm.  5. Mild  biatrial enlargement per 2D echocardiogram.  6. Hypertension, controlled   7. Dyslipidemia on Repatha with history of intolerance of statins.  8. Class II obesity with a BMI 32.39.    Plan recommendations    From the electrophysiologist on point he is doing well.  Will continue with current medical therapy.    Follow device clinic as scheduled    Follow my office every 6 months or sooner if needed.    Risk factor modification and lifestyle modification discussed with patient. Diet , exercise and hydration discussed with patient.    I have personally review with patient during this office visit, laboratory data, echocardiogram results, stress test results, Holter-event monitor results prior and after the last electrophysiology visit. All questions has been answered.    Please excuse any errors in grammar or translation related to this dictation.  Voice recognition software was utilized to prepare this document.

## 2025-01-08 NOTE — PATIENT INSTRUCTIONS
Follow up in 6 months  Remote device checks will occur at 3 and 9 month intervals.  In clinic device checks are to be done every 6 months, on the same day of your appointment.  Continue same medications and treatments.   Patient educated on proper medication use.   Patient educated on risk factor modification.   Please bring any lab results from other providers / physicians to your next appointment.     Please bring all medicines, vitamins, and herbal supplements with you when you come to the office.     Prescriptions will not be filled unless you are compliant with your follow up appointments or have a follow up appointment scheduled as per instruction of your physician. Refills should be requested at the time of your visit.  IISABEL LPN, AM SCRIBING FOR, AND IN THE PRESENCE OF DR. MIKALA LOPEZ MD

## 2025-02-24 ENCOUNTER — APPOINTMENT (OUTPATIENT)
Dept: PRIMARY CARE | Facility: CLINIC | Age: 80
End: 2025-02-24
Payer: MEDICARE

## 2025-03-19 ENCOUNTER — TELEPHONE (OUTPATIENT)
Dept: CARDIOLOGY | Facility: CLINIC | Age: 80
End: 2025-03-19
Payer: MEDICARE

## 2025-03-19 DIAGNOSIS — E11.9 TYPE 2 DIABETES MELLITUS WITHOUT COMPLICATION, WITHOUT LONG-TERM CURRENT USE OF INSULIN (MULTI): Primary | ICD-10-CM

## 2025-03-19 NOTE — TELEPHONE ENCOUNTER
Pt left a message requesting BW order placed to have done before his 03/26/25. Pt was very upset orders weren't already placed in his chart stated he went this morning to have it done, requesting a call back once orders are placed please

## 2025-03-20 ENCOUNTER — TELEPHONE (OUTPATIENT)
Dept: CARDIOLOGY | Facility: CLINIC | Age: 80
End: 2025-03-20
Payer: MEDICARE

## 2025-03-20 LAB
ALBUMIN SERPL-MCNC: 4.4 G/DL (ref 3.6–5.1)
ALP SERPL-CCNC: 46 U/L (ref 35–144)
ALT SERPL-CCNC: 13 U/L (ref 9–46)
ANION GAP SERPL CALCULATED.4IONS-SCNC: 7 MMOL/L (CALC) (ref 7–17)
AST SERPL-CCNC: 14 U/L (ref 10–35)
BILIRUB SERPL-MCNC: 0.6 MG/DL (ref 0.2–1.2)
BUN SERPL-MCNC: 17 MG/DL (ref 7–25)
CALCIUM SERPL-MCNC: 9.1 MG/DL (ref 8.6–10.3)
CHLORIDE SERPL-SCNC: 106 MMOL/L (ref 98–110)
CHOLEST SERPL-MCNC: 155 MG/DL
CHOLEST/HDLC SERPL: 3.4 (CALC)
CO2 SERPL-SCNC: 27 MMOL/L (ref 20–32)
CREAT SERPL-MCNC: 0.83 MG/DL (ref 0.7–1.28)
EGFRCR SERPLBLD CKD-EPI 2021: 89 ML/MIN/1.73M2
GLUCOSE SERPL-MCNC: 109 MG/DL (ref 65–99)
HDLC SERPL-MCNC: 45 MG/DL
LDLC SERPL CALC-MCNC: 82 MG/DL (CALC)
NONHDLC SERPL-MCNC: 110 MG/DL (CALC)
POTASSIUM SERPL-SCNC: 4.6 MMOL/L (ref 3.5–5.3)
PROT SERPL-MCNC: 7.3 G/DL (ref 6.1–8.1)
SODIUM SERPL-SCNC: 140 MMOL/L (ref 135–146)
TRIGL SERPL-MCNC: 186 MG/DL

## 2025-03-20 NOTE — TELEPHONE ENCOUNTER
Electronic Prior Authorization not supported. Submit via other methods.    Your PA request cannot be processed for the member plan submitted. For further inquiries please contact the number on the back of the member prescription card.

## 2025-03-26 ENCOUNTER — APPOINTMENT (OUTPATIENT)
Dept: PRIMARY CARE | Facility: CLINIC | Age: 80
End: 2025-03-26
Payer: MEDICARE

## 2025-03-26 VITALS
SYSTOLIC BLOOD PRESSURE: 115 MMHG | TEMPERATURE: 97.2 F | HEART RATE: 66 BPM | HEIGHT: 70 IN | DIASTOLIC BLOOD PRESSURE: 70 MMHG | WEIGHT: 230 LBS | BODY MASS INDEX: 32.93 KG/M2

## 2025-03-26 DIAGNOSIS — E66.811 CLASS 1 OBESITY WITH BODY MASS INDEX (BMI) OF 33.0 TO 33.9 IN ADULT, UNSPECIFIED OBESITY TYPE, UNSPECIFIED WHETHER SERIOUS COMORBIDITY PRESENT: ICD-10-CM

## 2025-03-26 DIAGNOSIS — I10 BENIGN ESSENTIAL HYPERTENSION: ICD-10-CM

## 2025-03-26 DIAGNOSIS — Z78.9 NEVER SMOKED CIGARETTES: ICD-10-CM

## 2025-03-26 DIAGNOSIS — E08.65 DIABETES MELLITUS DUE TO UNDERLYING CONDITION WITH HYPERGLYCEMIA, WITHOUT LONG-TERM CURRENT USE OF INSULIN: ICD-10-CM

## 2025-03-26 DIAGNOSIS — E78.2 HYPERLIPIDEMIA, MIXED: ICD-10-CM

## 2025-03-26 DIAGNOSIS — I48.91 ATRIAL FIBRILLATION, UNSPECIFIED TYPE (MULTI): ICD-10-CM

## 2025-03-26 PROCEDURE — 1160F RVW MEDS BY RX/DR IN RCRD: CPT | Performed by: FAMILY MEDICINE

## 2025-03-26 PROCEDURE — 99213 OFFICE O/P EST LOW 20 MIN: CPT | Performed by: FAMILY MEDICINE

## 2025-03-26 PROCEDURE — 1157F ADVNC CARE PLAN IN RCRD: CPT | Performed by: FAMILY MEDICINE

## 2025-03-26 PROCEDURE — 3074F SYST BP LT 130 MM HG: CPT | Performed by: FAMILY MEDICINE

## 2025-03-26 PROCEDURE — 1123F ACP DISCUSS/DSCN MKR DOCD: CPT | Performed by: FAMILY MEDICINE

## 2025-03-26 PROCEDURE — 1158F ADVNC CARE PLAN TLK DOCD: CPT | Performed by: FAMILY MEDICINE

## 2025-03-26 PROCEDURE — 3078F DIAST BP <80 MM HG: CPT | Performed by: FAMILY MEDICINE

## 2025-03-26 PROCEDURE — 1036F TOBACCO NON-USER: CPT | Performed by: FAMILY MEDICINE

## 2025-03-26 PROCEDURE — 1159F MED LIST DOCD IN RCRD: CPT | Performed by: FAMILY MEDICINE

## 2025-03-26 ASSESSMENT — ENCOUNTER SYMPTOMS
EYES NEGATIVE: 1
CONSTITUTIONAL NEGATIVE: 1
ALLERGIC/IMMUNOLOGIC NEGATIVE: 1
PSYCHIATRIC NEGATIVE: 1
GASTROINTESTINAL NEGATIVE: 1
MUSCULOSKELETAL NEGATIVE: 1
RESPIRATORY NEGATIVE: 1
ENDOCRINE NEGATIVE: 1
CARDIOVASCULAR NEGATIVE: 1
HEMATOLOGIC/LYMPHATIC NEGATIVE: 1
RHINORRHEA: 1

## 2025-03-26 ASSESSMENT — PATIENT HEALTH QUESTIONNAIRE - PHQ9
9. THOUGHTS THAT YOU WOULD BE BETTER OFF DEAD, OR OF HURTING YOURSELF: NOT AT ALL
8. MOVING OR SPEAKING SO SLOWLY THAT OTHER PEOPLE COULD HAVE NOTICED. OR THE OPPOSITE, BEING SO FIGETY OR RESTLESS THAT YOU HAVE BEEN MOVING AROUND A LOT MORE THAN USUAL: NOT AT ALL
10. IF YOU CHECKED OFF ANY PROBLEMS, HOW DIFFICULT HAVE THESE PROBLEMS MADE IT FOR YOU TO DO YOUR WORK, TAKE CARE OF THINGS AT HOME, OR GET ALONG WITH OTHER PEOPLE: NOT DIFFICULT AT ALL
1. LITTLE INTEREST OR PLEASURE IN DOING THINGS: NOT AT ALL
7. TROUBLE CONCENTRATING ON THINGS, SUCH AS READING THE NEWSPAPER OR WATCHING TELEVISION: NOT AT ALL
3. TROUBLE FALLING OR STAYING ASLEEP OR SLEEPING TOO MUCH: NEARLY EVERY DAY
4. FEELING TIRED OR HAVING LITTLE ENERGY: NEARLY EVERY DAY
2. FEELING DOWN, DEPRESSED OR HOPELESS: NEARLY EVERY DAY
5. POOR APPETITE OR OVEREATING: NOT AT ALL
SUM OF ALL RESPONSES TO PHQ9 QUESTIONS 1 AND 2: 3
6. FEELING BAD ABOUT YOURSELF - OR THAT YOU ARE A FAILURE OR HAVE LET YOURSELF OR YOUR FAMILY DOWN: NOT AT ALL
SUM OF ALL RESPONSES TO PHQ QUESTIONS 1-9: 9

## 2025-03-26 NOTE — PROGRESS NOTES
Sumit Corral is here for his follow-up on diabetes and hypertension.  He states that he is overall been feeling well.  His only complaint is that of intermittent tinnitus that he has had for several years associated with hearing loss.  [facility] and hearing aids had been prescribed which the patient said were not helpful.  He does have some chronic hearing loss.  He notes intermittent episodes of rhinorrhea especially indoors.  He does not know of any allergies.  He sees Dr. Rashaad maddox and Dr. Jasmine for follow-up on his pacemaker.  He continues on his medications as noted    Patient ID: Juan Loza is a 80 y.o. male who presents for Follow-up (4m follow up):    Problem List Items Addressed This Visit       Benign essential hypertension    Relevant Orders    Hemoglobin A1C    Hemoglobin A1C    Comprehensive Metabolic Panel    Fibrillation, atrial (Multi)    Hyperlipidemia, mixed    Relevant Orders    Hemoglobin A1C    Hemoglobin A1C    Comprehensive Metabolic Panel    Never smoked cigarettes    Diabetes mellitus (Multi)    Relevant Orders    Hemoglobin A1C    Hemoglobin A1C    Comprehensive Metabolic Panel    Albumin-Creatinine Ratio, Urine Random     Other Visit Diagnoses       Class 1 obesity with body mass index (BMI) of 33.0 to 33.9 in adult, unspecified obesity type, unspecified whether serious comorbidity present        Relevant Orders    Hemoglobin A1C    Hemoglobin A1C    Comprehensive Metabolic Panel           Past Medical History:   Diagnosis Date    Encounter for immunization 11/24/2021    Encounter for immunization    Ingrowing nail 03/03/2022    Ingrown toenail    Obesity, unspecified 05/02/2022    Class 2 obesity with body mass index (BMI) of 36.0 to 36.9 in adult    Obesity, unspecified 10/17/2022    Class 2 obesity with body mass index (BMI) of 37.0 to 37.9 in adult    Obesity, unspecified 07/27/2022    Class 2 obesity with body mass index (BMI) of 35.0 to 35.9  in adult    Obesity, unspecified 04/12/2022    Class 2 obesity with body mass index (BMI) of 35.0 to 35.9 in adult    Other acute postprocedural pain 05/09/2022    Post-op pain    Other conditions influencing health status 11/14/2020    Postoperative bleeding from mouth    Personal history of other diseases of the digestive system 11/14/2020    History of oral hemorrhage    Personal history of other diseases of the musculoskeletal system and connective tissue 06/15/2022    History of neck pain    Personal history of other specified conditions 05/02/2022    History of fatigue    Personal history of other specified conditions 05/02/2022    History of abdominal pain    Stiffness of right hand, not elsewhere classified 07/06/2022    Stiffness of finger joint of right hand      Past Surgical History:   Procedure Laterality Date    OTHER SURGICAL HISTORY  11/24/2021    Hip replacement    OTHER SURGICAL HISTORY  11/24/2021    Wrist surgery    OTHER SURGICAL HISTORY  11/24/2021    Percutaneous transluminal coronary angioplasty    OTHER SURGICAL HISTORY  11/24/2021    Pacemaker insertion    OTHER SURGICAL HISTORY  11/24/2021    Appendectomy    OTHER SURGICAL HISTORY  11/24/2021    Back surgery    OTHER SURGICAL HISTORY  11/24/2021    Esophagogastroduodenoscopy    OTHER SURGICAL HISTORY  11/24/2021    Tonsillectomy    OTHER SURGICAL HISTORY  11/24/2021    Varicose vein sclerotherapy    OTHER SURGICAL HISTORY  12/09/2021    Complete colonoscopy    OTHER SURGICAL HISTORY  12/15/2021    Cataract surgery    OTHER SURGICAL HISTORY  06/29/2022    Circumcision      Family History   Problem Relation Name Age of Onset    Arthritis Mother      Hypertension Father      Stroke Father      Hypertension Brother      Esophageal cancer Son      Leukemia Mother's Sister        Social History     Socioeconomic History    Marital status:      Spouse name: Not on file    Number of children: Not on file    Years of education: Not on file     Highest education level: Not on file   Occupational History    Not on file   Tobacco Use    Smoking status: Never     Passive exposure: Never    Smokeless tobacco: Never   Vaping Use    Vaping status: Never Used   Substance and Sexual Activity    Alcohol use: Yes     Comment: 3 beers weekly    Drug use: Never    Sexual activity: Defer   Other Topics Concern    Not on file   Social History Narrative    Not on file     Social Drivers of Health     Financial Resource Strain: Not on file   Food Insecurity: Not on file   Transportation Needs: Not on file   Physical Activity: Not on file   Stress: Not on file   Social Connections: Not on file   Intimate Partner Violence: Not on file   Housing Stability: Not on file      Metformin, Statins-hmg-coa reductase inhibitors, and Glimepiride   Current Outpatient Medications   Medication Sig Dispense Refill    aspirin 81 mg EC tablet Take 1 tablet (81 mg) by mouth once daily.      cholecalciferol (Vitamin D3) 50 mcg (2,000 unit) capsule Take 1 capsule (50 mcg) by mouth once daily.      clopidogrel (Plavix) 75 mg tablet TAKE ONE TABLET BY MOUTH EVERY DAY 90 tablet 1    isosorbide mononitrate ER (Imdur) 60 mg 24 hr tablet TAKE ONE TABLET BY MOUTH DAILY 90 tablet 3    Jardiance 25 mg Take 1 tablet (25 mg) by mouth once daily.      lancets misc Use twice daily to check blood sugar 200 each 3    lisinopril 10 mg tablet TAKE ONE TABLET BY MOUTH TWO TIMES A  tablet 0    metoprolol tartrate (Lopressor) 25 mg tablet TAKE ONE TABLET BY MOUTH TWO TIMES A  tablet 3    multivit-min/ferrous fumarate (MULTI VITAMIN ORAL) Take 1 tablet by mouth once daily.      nitroglycerin (Nitrostat) 0.4 mg SL tablet Place 1 tablet (0.4 mg) under the tongue every 5 minutes if needed for chest pain (FOR UP TO 3 DOSES AS NEEDED FOR CHEST PAIN.). 25 tablet 5    OneTouch Ultra Test strip Inject 2 strips under the skin once daily. 200 strip 3    OneTouch Ultra2 Meter misc TEST ONCE A DAY       Repatha SureClick 140 mg/mL injection ADMINISTER 140MG(1 INJECTION) UNDER THE SKIN EVERY 14 DAYS 6 mL 3     No current facility-administered medications for this visit.       Immunization History   Administered Date(s) Administered    Flu vaccine, trivalent, preservative free, HIGH-DOSE, age 65y+ (Fluzone) 10/12/2015, 10/24/2017, 11/16/2018, 10/01/2019, 09/16/2020, 10/19/2021, 09/28/2022, 10/21/2024    Flu vaccine, trivalent, preservative free, age 6 months and greater (Fluarix/Fluzone/Flulaval) 11/26/2012    Influenza, Unspecified 10/01/2014, 11/01/2015, 09/16/2016    Influenza, seasonal, injectable 09/23/2014    Moderna SARS-CoV-2 Vaccination 02/17/2021, 03/17/2021, 10/25/2021    Pneumococcal conjugate vaccine, 13-valent (PREVNAR 13) 11/30/2015, 10/28/2016    Pneumococcal polysaccharide vaccine, 23-valent, age 2 years and older (PNEUMOVAX 23) 03/21/2014, 11/17/2015    Zoster, Unspecified 08/31/2020, 11/16/2020    Zoster, live 01/01/2013        Review of Systems   Constitutional: Negative.    HENT:  Positive for hearing loss, rhinorrhea and tinnitus.    Eyes: Negative.    Respiratory: Negative.     Cardiovascular: Negative.    Gastrointestinal: Negative.    Endocrine: Negative.    Genitourinary: Negative.    Musculoskeletal: Negative.    Skin: Negative.    Allergic/Immunologic: Negative.    Hematological: Negative.    Psychiatric/Behavioral: Negative.     All other systems reviewed and are negative.       Vitals:    03/26/25 0933   BP: 115/70   Pulse: 66   Temp: 36.2 °C (97.2 °F)     Vitals:    03/26/25 0933   Weight: 104 kg (230 lb)      Physical Exam  Constitutional:       General: He is not in acute distress.     Appearance: Normal appearance.   Cardiovascular:      Rate and Rhythm: Normal rate and regular rhythm.      Pulses: Normal pulses.      Heart sounds: Normal heart sounds. No murmur heard.     No friction rub. No gallop.   Pulmonary:      Effort: No respiratory distress.      Breath sounds: Normal breath  sounds. No wheezing or rales.   Neurological:      Mental Status: He is alert.          ASSESSMENT/PLAN: Diabetes mellitus type 2.  Home blood sugars are 10 5-1 15.  A1c was not done with his recent labs.  Will obtain A1c now for update.  Continue current meds and continue to follow diabetic diet.  Eye exams are up-to-date.  Check CMP A1c and urine for microalbumin in 4 months    Hyperlipidemia stable on Repatha and managed by cardiology    Hypertension stable.  Continue lisinopril and metoprolol as noted    History of coronary artery disease managed by cardiology.  Stress test scheduled for June 2025.  Also follow-up with electrophysiology for pacemaker care.    Will notify patient of upcoming A1c level.  Follow-up in 4 months and call as needed    Chronic hearing loss and tinnitus.  [facility].

## 2025-03-26 NOTE — PROGRESS NOTES
Subjective     Patient ID: Juan Loza is a 80 y.o. male who presents for Follow-up (4m follow up):    Problem List Items Addressed This Visit    None     Past Medical History:   Diagnosis Date    Encounter for immunization 11/24/2021    Encounter for immunization    Ingrowing nail 03/03/2022    Ingrown toenail    Obesity, unspecified 05/02/2022    Class 2 obesity with body mass index (BMI) of 36.0 to 36.9 in adult    Obesity, unspecified 10/17/2022    Class 2 obesity with body mass index (BMI) of 37.0 to 37.9 in adult    Obesity, unspecified 07/27/2022    Class 2 obesity with body mass index (BMI) of 35.0 to 35.9 in adult    Obesity, unspecified 04/12/2022    Class 2 obesity with body mass index (BMI) of 35.0 to 35.9 in adult    Other acute postprocedural pain 05/09/2022    Post-op pain    Other conditions influencing health status 11/14/2020    Postoperative bleeding from mouth    Personal history of other diseases of the digestive system 11/14/2020    History of oral hemorrhage    Personal history of other diseases of the musculoskeletal system and connective tissue 06/15/2022    History of neck pain    Personal history of other specified conditions 05/02/2022    History of fatigue    Personal history of other specified conditions 05/02/2022    History of abdominal pain    Stiffness of right hand, not elsewhere classified 07/06/2022    Stiffness of finger joint of right hand      Past Surgical History:   Procedure Laterality Date    OTHER SURGICAL HISTORY  11/24/2021    Hip replacement    OTHER SURGICAL HISTORY  11/24/2021    Wrist surgery    OTHER SURGICAL HISTORY  11/24/2021    Percutaneous transluminal coronary angioplasty    OTHER SURGICAL HISTORY  11/24/2021    Pacemaker insertion    OTHER SURGICAL HISTORY  11/24/2021    Appendectomy    OTHER SURGICAL HISTORY  11/24/2021    Back surgery    OTHER SURGICAL HISTORY  11/24/2021    Esophagogastroduodenoscopy    OTHER SURGICAL HISTORY  11/24/2021     Tonsillectomy    OTHER SURGICAL HISTORY  11/24/2021    Varicose vein sclerotherapy    OTHER SURGICAL HISTORY  12/09/2021    Complete colonoscopy    OTHER SURGICAL HISTORY  12/15/2021    Cataract surgery    OTHER SURGICAL HISTORY  06/29/2022    Circumcision      Family History   Problem Relation Name Age of Onset    Arthritis Mother      Hypertension Father      Stroke Father      Hypertension Brother      Esophageal cancer Son      Leukemia Mother's Sister        Social History     Socioeconomic History    Marital status:      Spouse name: Not on file    Number of children: Not on file    Years of education: Not on file    Highest education level: Not on file   Occupational History    Not on file   Tobacco Use    Smoking status: Never     Passive exposure: Never    Smokeless tobacco: Never   Vaping Use    Vaping status: Never Used   Substance and Sexual Activity    Alcohol use: Yes     Comment: 3 beers weekly    Drug use: Never    Sexual activity: Defer   Other Topics Concern    Not on file   Social History Narrative    Not on file     Social Drivers of Health     Financial Resource Strain: Not on file   Food Insecurity: Not on file   Transportation Needs: Not on file   Physical Activity: Not on file   Stress: Not on file   Social Connections: Not on file   Intimate Partner Violence: Not on file   Housing Stability: Not on file      Metformin, Statins-hmg-coa reductase inhibitors, and Glimepiride   Current Outpatient Medications   Medication Sig Dispense Refill    aspirin 81 mg EC tablet Take 1 tablet (81 mg) by mouth once daily.      cholecalciferol (Vitamin D3) 50 mcg (2,000 unit) capsule Take 1 capsule (50 mcg) by mouth once daily.      clopidogrel (Plavix) 75 mg tablet TAKE ONE TABLET BY MOUTH EVERY DAY 90 tablet 1    isosorbide mononitrate ER (Imdur) 60 mg 24 hr tablet TAKE ONE TABLET BY MOUTH DAILY 90 tablet 3    Jardiance 25 mg Take 1 tablet (25 mg) by mouth once daily.      lancets misc Use twice  daily to check blood sugar 200 each 3    lisinopril 10 mg tablet TAKE ONE TABLET BY MOUTH TWO TIMES A  tablet 0    metoprolol tartrate (Lopressor) 25 mg tablet TAKE ONE TABLET BY MOUTH TWO TIMES A  tablet 3    multivit-min/ferrous fumarate (MULTI VITAMIN ORAL) Take 1 tablet by mouth once daily.      nitroglycerin (Nitrostat) 0.4 mg SL tablet Place 1 tablet (0.4 mg) under the tongue every 5 minutes if needed for chest pain (FOR UP TO 3 DOSES AS NEEDED FOR CHEST PAIN.). 25 tablet 5    OneTouch Ultra Test strip Inject 2 strips under the skin once daily. 200 strip 3    OneTouch Ultra2 Meter misc TEST ONCE A DAY      Repatha SureClick 140 mg/mL injection ADMINISTER 140MG(1 INJECTION) UNDER THE SKIN EVERY 14 DAYS 6 mL 3     No current facility-administered medications for this visit.       Immunization History   Administered Date(s) Administered    Flu vaccine, trivalent, preservative free, HIGH-DOSE, age 65y+ (Fluzone) 10/12/2015, 10/24/2017, 11/16/2018, 10/01/2019, 09/16/2020, 10/19/2021, 09/28/2022, 10/21/2024    Flu vaccine, trivalent, preservative free, age 6 months and greater (Fluarix/Fluzone/Flulaval) 11/26/2012    Influenza, Unspecified 10/01/2014, 11/01/2015, 09/16/2016    Influenza, seasonal, injectable 09/23/2014    Moderna SARS-CoV-2 Vaccination 02/17/2021, 03/17/2021, 10/25/2021    Pneumococcal conjugate vaccine, 13-valent (PREVNAR 13) 11/30/2015, 10/28/2016    Pneumococcal polysaccharide vaccine, 23-valent, age 2 years and older (PNEUMOVAX 23) 03/21/2014, 11/17/2015    Zoster, Unspecified 08/31/2020, 11/16/2020    Zoster, live 01/01/2013        Review of Systems     Vitals:    03/26/25 0933   BP: 115/70   Pulse: 66   Temp: 36.2 °C (97.2 °F)     Vitals:    03/26/25 0933   Weight: 104 kg (230 lb)      Physical Exam     ASSESSMENT/PLAN:       Scribe Attestation  By signing my name below, I, Amee Manning LPN, Scribe   attest that this documentation has been prepared under the direction and  in the presence of Donnie Farnsworth MD.

## 2025-03-29 LAB
EST. AVERAGE GLUCOSE BLD GHB EST-MCNC: 154 MG/DL
EST. AVERAGE GLUCOSE BLD GHB EST-SCNC: 8.5 MMOL/L
HBA1C MFR BLD: 7 % OF TOTAL HGB

## 2025-04-02 DIAGNOSIS — I10 BENIGN ESSENTIAL HYPERTENSION: ICD-10-CM

## 2025-04-02 RX ORDER — LISINOPRIL 10 MG/1
10 TABLET ORAL 2 TIMES DAILY
Qty: 180 TABLET | Refills: 3 | Status: SHIPPED | OUTPATIENT
Start: 2025-04-02 | End: 2026-04-02

## 2025-04-02 NOTE — TELEPHONE ENCOUNTER
Received request for prescription refills for patient.   Patient follows with Dr. Neil    Request is for Lisinopril  Is patient currently on medication yes    Last OV 12/17/2024  Next OV 6/24/2025    Pended for signing and sent to provider

## 2025-04-15 ENCOUNTER — HOSPITAL ENCOUNTER (OUTPATIENT)
Dept: CARDIOLOGY | Facility: HOSPITAL | Age: 80
Discharge: HOME | End: 2025-04-15
Payer: MEDICARE

## 2025-04-15 DIAGNOSIS — Z95.0 CARDIAC PACEMAKER IN SITU: ICD-10-CM

## 2025-04-15 DIAGNOSIS — I49.5 SICK SINUS SYNDROME (MULTI): ICD-10-CM

## 2025-04-15 PROCEDURE — 93296 REM INTERROG EVL PM/IDS: CPT

## 2025-04-15 PROCEDURE — 93294 REM INTERROG EVL PM/LDLS PM: CPT | Performed by: INTERNAL MEDICINE

## 2025-06-17 ENCOUNTER — APPOINTMENT (OUTPATIENT)
Dept: CARDIOLOGY | Facility: CLINIC | Age: 80
End: 2025-06-17
Payer: MEDICARE

## 2025-06-17 ENCOUNTER — HOSPITAL ENCOUNTER (OUTPATIENT)
Dept: CARDIOLOGY | Facility: CLINIC | Age: 80
Discharge: HOME | End: 2025-06-17
Payer: MEDICARE

## 2025-06-17 ENCOUNTER — HOSPITAL ENCOUNTER (OUTPATIENT)
Dept: RADIOLOGY | Facility: CLINIC | Age: 80
Discharge: HOME | End: 2025-06-17
Payer: MEDICARE

## 2025-06-17 ENCOUNTER — APPOINTMENT (OUTPATIENT)
Dept: RADIOLOGY | Facility: CLINIC | Age: 80
End: 2025-06-17
Payer: MEDICARE

## 2025-06-17 DIAGNOSIS — I21.4 NON-ST ELEVATED MYOCARDIAL INFARCTION (MULTI): ICD-10-CM

## 2025-06-17 DIAGNOSIS — Z98.61 CAD S/P PERCUTANEOUS CORONARY ANGIOPLASTY: ICD-10-CM

## 2025-06-17 DIAGNOSIS — I25.5 ISCHEMIC CARDIOMYOPATHY: ICD-10-CM

## 2025-06-17 DIAGNOSIS — I48.91 ATRIAL FIBRILLATION, UNSPECIFIED TYPE (MULTI): ICD-10-CM

## 2025-06-17 DIAGNOSIS — I25.10 CAD S/P PERCUTANEOUS CORONARY ANGIOPLASTY: ICD-10-CM

## 2025-06-17 DIAGNOSIS — I21.4 NON-ST ELEVATED MYOCARDIAL INFARCTION (MULTI): Primary | ICD-10-CM

## 2025-06-17 LAB
AORTIC VALVE MEAN GRADIENT: 4 MMHG
AORTIC VALVE PEAK VELOCITY: 1.21 M/S
AV PEAK GRADIENT: 6 MMHG
AVA (PEAK VEL): 2.3 CM2
AVA (VTI): 2.37 CM2
EJECTION FRACTION APICAL 4 CHAMBER: 50.4
EJECTION FRACTION: 53 %
LEFT VENTRICLE INTERNAL DIMENSION DIASTOLE: 4.8 CM (ref 3.5–6)
LEFT VENTRICULAR OUTFLOW TRACT DIAMETER: 2.1 CM
LV EJECTION FRACTION BIPLANE: 52 %
MITRAL VALVE E/A RATIO: 0.8
MITRAL VALVE E/E' RATIO: 7.5
RIGHT VENTRICLE FREE WALL PEAK S': 8.68 CM/S
RIGHT VENTRICLE PEAK SYSTOLIC PRESSURE: 30 MMHG
TRICUSPID ANNULAR PLANE SYSTOLIC EXCURSION: 2.6 CM

## 2025-06-17 PROCEDURE — 3430000001 HC RX 343 DIAGNOSTIC RADIOPHARMACEUTICALS: Performed by: INTERNAL MEDICINE

## 2025-06-17 PROCEDURE — A9502 TC99M TETROFOSMIN: HCPCS | Performed by: INTERNAL MEDICINE

## 2025-06-17 PROCEDURE — 93017 CV STRESS TEST TRACING ONLY: CPT

## 2025-06-17 PROCEDURE — 2500000004 HC RX 250 GENERAL PHARMACY W/ HCPCS (ALT 636 FOR OP/ED): Performed by: INTERNAL MEDICINE

## 2025-06-17 PROCEDURE — 78452 HT MUSCLE IMAGE SPECT MULT: CPT

## 2025-06-17 PROCEDURE — 93016 CV STRESS TEST SUPVJ ONLY: CPT | Performed by: INTERNAL MEDICINE

## 2025-06-17 PROCEDURE — 93018 CV STRESS TEST I&R ONLY: CPT | Performed by: INTERNAL MEDICINE

## 2025-06-17 PROCEDURE — 93306 TTE W/DOPPLER COMPLETE: CPT | Performed by: INTERNAL MEDICINE

## 2025-06-17 PROCEDURE — 78452 HT MUSCLE IMAGE SPECT MULT: CPT | Performed by: INTERNAL MEDICINE

## 2025-06-17 PROCEDURE — 93306 TTE W/DOPPLER COMPLETE: CPT

## 2025-06-17 RX ORDER — REGADENOSON 0.08 MG/ML
0.4 INJECTION, SOLUTION INTRAVENOUS ONCE
Status: COMPLETED | OUTPATIENT
Start: 2025-06-17 | End: 2025-06-17

## 2025-06-17 RX ADMIN — REGADENOSON 0.4 MG: 0.08 INJECTION, SOLUTION INTRAVENOUS at 08:51

## 2025-06-17 RX ADMIN — TETROFOSMIN 11.2 MILLICURIE: 0.23 INJECTION, POWDER, LYOPHILIZED, FOR SOLUTION INTRAVENOUS at 07:21

## 2025-06-17 RX ADMIN — TETROFOSMIN 35.4 MILLICURIE: 0.23 INJECTION, POWDER, LYOPHILIZED, FOR SOLUTION INTRAVENOUS at 08:52

## 2025-06-20 DIAGNOSIS — I48.91 ATRIAL FIBRILLATION, UNSPECIFIED TYPE (MULTI): ICD-10-CM

## 2025-06-20 DIAGNOSIS — E08.65 DIABETES MELLITUS DUE TO UNDERLYING CONDITION WITH HYPERGLYCEMIA, WITHOUT LONG-TERM CURRENT USE OF INSULIN: ICD-10-CM

## 2025-06-20 RX ORDER — CLOPIDOGREL BISULFATE 75 MG/1
75 TABLET ORAL DAILY
Qty: 90 TABLET | Refills: 1 | Status: SHIPPED | OUTPATIENT
Start: 2025-06-20

## 2025-06-20 RX ORDER — BLOOD SUGAR DIAGNOSTIC
STRIP MISCELLANEOUS
Qty: 200 STRIP | Refills: 3 | Status: SHIPPED | OUTPATIENT
Start: 2025-06-20

## 2025-06-20 RX ORDER — BLOOD-GLUCOSE CONTROL, NORMAL
EACH MISCELLANEOUS
Qty: 200 EACH | Refills: 3 | Status: SHIPPED | OUTPATIENT
Start: 2025-06-20

## 2025-06-24 ENCOUNTER — APPOINTMENT (OUTPATIENT)
Dept: CARDIOLOGY | Facility: CLINIC | Age: 80
End: 2025-06-24
Payer: MEDICARE

## 2025-06-24 VITALS
DIASTOLIC BLOOD PRESSURE: 62 MMHG | BODY MASS INDEX: 32.63 KG/M2 | SYSTOLIC BLOOD PRESSURE: 118 MMHG | HEART RATE: 76 BPM | WEIGHT: 227.9 LBS | HEIGHT: 70 IN

## 2025-06-24 DIAGNOSIS — I25.5 ISCHEMIC CARDIOMYOPATHY: ICD-10-CM

## 2025-06-24 DIAGNOSIS — I25.10 CAD S/P PERCUTANEOUS CORONARY ANGIOPLASTY: ICD-10-CM

## 2025-06-24 DIAGNOSIS — E78.2 HYPERLIPIDEMIA, MIXED: ICD-10-CM

## 2025-06-24 DIAGNOSIS — Z78.9 NEVER SMOKED CIGARETTES: ICD-10-CM

## 2025-06-24 DIAGNOSIS — I87.309 CHRONIC PERIPHERAL VENOUS HYPERTENSION: ICD-10-CM

## 2025-06-24 DIAGNOSIS — I48.91 ATRIAL FIBRILLATION, UNSPECIFIED TYPE (MULTI): ICD-10-CM

## 2025-06-24 DIAGNOSIS — Z98.61 CAD S/P PERCUTANEOUS CORONARY ANGIOPLASTY: ICD-10-CM

## 2025-06-24 DIAGNOSIS — I10 BENIGN ESSENTIAL HYPERTENSION: ICD-10-CM

## 2025-06-24 PROCEDURE — 3074F SYST BP LT 130 MM HG: CPT | Performed by: INTERNAL MEDICINE

## 2025-06-24 PROCEDURE — 1159F MED LIST DOCD IN RCRD: CPT | Performed by: INTERNAL MEDICINE

## 2025-06-24 PROCEDURE — 1036F TOBACCO NON-USER: CPT | Performed by: INTERNAL MEDICINE

## 2025-06-24 PROCEDURE — 3078F DIAST BP <80 MM HG: CPT | Performed by: INTERNAL MEDICINE

## 2025-06-24 PROCEDURE — 99214 OFFICE O/P EST MOD 30 MIN: CPT | Performed by: INTERNAL MEDICINE

## 2025-06-24 NOTE — PROGRESS NOTES
Patient:  Juan Loza  YOB: 1945  MRN: [id number]     Chief complaint:   Chief Complaint   Patient presents with    Follow-up     Patient here for routine 6 month follow up for ongoing management of Coronary Disease and Hypertension follow ups.     Results     Results of stress test        HPI:       Juan Loza is a 80 y.o. male who returns today for cardiac follow-up.  She had recent stress test.  This is fine showing remote scarring from years ago but no ischemia and unchanged from 2020.  He also had an echo showing EF 50 to 55% with some minor valvular regurgitation but no changes since his last echo in 2022.  He is asymptomatic.  He is on his usual meds.  Vitals are normal.  Exam is unchanged.      Objective:     Vitals:    06/24/25 1220   BP: 118/62   Pulse: 76       Wt Readings from Last 4 Encounters:   06/24/25 103 kg (227 lb 14.4 oz)   03/26/25 104 kg (230 lb)   01/08/25 102 kg (225 lb)   12/17/24 106 kg (233 lb 4.8 oz)       Allergies:     Allergies   Allergen Reactions    Metformin Other    Statins-Hmg-Coa Reductase Inhibitors Other    Glimepiride Other, Diarrhea and Palpitations        Medications:     Current Outpatient Medications   Medication Instructions    aspirin 81 mg, oral, Daily    cholecalciferol (Vitamin D3) 50 mcg (2,000 unit) capsule 1 capsule, Daily    clopidogrel (PLAVIX) 75 mg, oral, Daily    isosorbide mononitrate ER (IMDUR) 60 mg, oral, Daily    Jardiance 25 mg Take 1 tablet (25 mg) by mouth once daily.    lancets (OneTouch Delica Plus Lancet) 30 gauge misc use twice daily to check blood sugar    lisinopril 10 mg, oral, 2 times daily    metoprolol tartrate (LOPRESSOR) 25 mg, oral, 2 times daily    multivit-min/ferrous fumarate (MULTI VITAMIN ORAL) 1 tablet, Daily    nitroglycerin (NITROSTAT) 0.4 mg, sublingual, Every 5 min PRN    OneTouch Ultra Test use 2 strips under the skin once daily.    OneTouch Ultra2 Meter misc TEST ONCE A DAY    Repatha SureClick 140 mg/mL  "injection ADMINISTER 140MG(1 INJECTION) UNDER THE SKIN EVERY 14 DAYS       Physical Examination:     Constitutional:       Appearance: Healthy appearance. Not in distress.   Neck:      Vascular: No JVR. JVD normal.   Pulmonary:      Effort: Pulmonary effort is normal.      Breath sounds: Normal breath sounds. No wheezing. No rhonchi. No rales.   Chest:      Chest wall: Not tender to palpatation.   Cardiovascular:      PMI at left midclavicular line. Normal rate. Regular rhythm. Normal S1. Normal S2.       Murmurs: There is no murmur.      No gallop.  No click. No rub.   Pulses:     Intact distal pulses.   Edema:     Peripheral edema absent.   Abdominal:      General: Bowel sounds are normal.      Palpations: Abdomen is soft.      Tenderness: There is no abdominal tenderness.   Musculoskeletal: Normal range of motion.         General: No tenderness. Skin:     General: Skin is warm and dry.   Neurological:      General: No focal deficit present.      Mental Status: Alert and oriented to person, place and time.          Lab:     CBC:   Lab Results   Component Value Date    WBC 6.2 09/28/2023    RBC 5.08 09/28/2023    HGB 15.6 09/28/2023    HCT 47.6 09/28/2023     09/28/2023        CMP:    Lab Results   Component Value Date     03/20/2025    K 4.6 03/20/2025     03/20/2025    CO2 27 03/20/2025    BUN 17 03/20/2025    CREATININE 0.83 03/20/2025    GLUCOSE 109 (H) 03/20/2025    CALCIUM 9.1 03/20/2025       Magnesium:    No results found for: \"MG\"    Lipid Profile:    Lab Results   Component Value Date    TRIG 186 (H) 03/20/2025    HDL 45 03/20/2025    LDLCALC 82 03/20/2025       TSH:    Lab Results   Component Value Date    TSH 0.45 05/02/2022       BNP:   No results found for: \"BNP\"     PT/INR:    No results found for: \"PROTIME\", \"INR\"    HgBA1c:    Lab Results   Component Value Date    HGBA1C 7.0 (H) 03/28/2025       BMP:  Lab Results   Component Value Date     03/20/2025     10/15/2024    " " 06/06/2024     02/01/2024    K 4.6 03/20/2025    K 4.9 10/15/2024    K 4.9 06/06/2024    K 4.6 02/01/2024     03/20/2025     10/15/2024     06/06/2024     02/01/2024    CO2 27 03/20/2025    CO2 28 10/15/2024    CO2 28 06/06/2024    CO2 29 02/01/2024    BUN 17 03/20/2025    BUN 18 10/15/2024    BUN 20 06/06/2024    BUN 16 02/01/2024    CREATININE 0.83 03/20/2025    CREATININE 0.88 10/15/2024    CREATININE 0.91 06/06/2024    CREATININE 0.98 02/01/2024       CBC:  Lab Results   Component Value Date    WBC 6.2 09/28/2023    WBC 6.2 05/02/2022    WBC 7.0 04/07/2022    RBC 5.08 09/28/2023    RBC 5.09 05/02/2022    RBC 5.27 04/07/2022    HGB 15.6 09/28/2023    HGB 15.7 05/02/2022    HGB 16.2 04/07/2022    HCT 47.6 09/28/2023    HCT 49.5 05/02/2022    HCT 50.5 04/07/2022    MCV 94 09/28/2023    MCV 97 05/02/2022    MCV 96 04/07/2022    MCHC 32.8 09/28/2023    MCHC 31.7 (L) 05/02/2022    MCHC 32.1 04/07/2022    RDW 13.1 09/28/2023    RDW 12.9 05/02/2022    RDW 13.8 04/07/2022     09/28/2023     05/02/2022     04/07/2022       Cardiac Enzymes:    No results found for: \"TROPHS\"    Hepatic Function Panel:    Lab Results   Component Value Date    ALKPHOS 46 03/20/2025    ALT 13 03/20/2025    AST 14 03/20/2025    PROT 7.3 03/20/2025    BILITOT 0.6 03/20/2025       Diagnostic Studies:     Transthoracic Echo Complete  Result Date: 6/17/2025              [address], [address], [place] [address]          Tel [phone] Fax [phone] TRANSTHORACIC ECHOCARDIOGRAM REPORT Patient Name:       NICK Martines Physician:    [id number] Neil DO Study Date:         6/17/2025           Ordering Provider:    Deanna NEIL MRN/PID:            [id number]            Fellow: Accession#:         " [id number]        Nurse: Date of Birth/Age:  1945 / 80 years Sonographer:          Carlton Mendoza Gender Assigned at  M                   Additional Staff: Birth: Height:             177.80 cm           Admit Date:           6/17/2025 Weight:             104.33 kg           Admission Status:     Outpatient BSA / BMI:          2.22 m2 / 33.00     Department Location:  [facility] Heart                     kg/m2                                     Macy Núñez Blood Pressure: 120 /60 mmHg Study Type:    TRANSTHORACIC ECHO (TTE) COMPLETE Diagnosis/ICD: Ischemic cardiomyopathy-I25.5 Indication:    ICM CPT Codes:     Echo Complete w Full [id number] Patient History: Diabetes:          Yes Pacer/Defib:       Permanent pacemaker Pertinent History: A-Fib, CAD, Chest Pain and ICM, SSS, NSTEMI. Study Detail: The following Echo studies were performed: 2D, M-Mode, Doppler and               color flow. The patient was awake.  PHYSICIAN INTERPRETATION: Left Ventricle: The left ventricular systolic function is low normal with a visually estimated ejection fraction of 50-55%. Wall motion is abnormal. The left ventricular cavity size is normal. There is normal septal and normal posterior left ventricular wall thickness. Spectral Doppler shows a Grade I (impaired relaxation pattern) of left ventricular diastolic filling with normal left atrial filling pressure. LV Wall Scoring: The basal and mid anterolateral wall is hypokinetic. Left Atrium: The left atrial size is mildly dilated. Right Ventricle: The right ventricle is mildly enlarged. There is normal right ventricular global systolic function. A device is visualized in the right ventricle. Right Atrium: The right atrial size is mildly dilated. Aortic Valve: The aortic valve appears structurally normal. The aortic valve area by VTI is 2.37 cmï¿½ with a peak velocity of 1.21 m/s. The peak and mean gradients are 6 mmHg and 4 mmHg, respectively, with a dimensionless index of  0.69. There is no evidence of aortic valve stenosis. There is mild aortic valve regurgitation. Mitral Valve: The mitral valve is normal in structure. The doppler estimated peak and mean diastolic gradients are 2 mmHg and 1 mmHg, respectively. There is mild thickening of the anterior and posterior mitral valve leaflets. There is no evidence of mitral valve stenosis. There is normal mitral valve leaflet mobility. There is mild mitral annular calcification. There is mild mitral valve regurgitation. The E Vmax is 0.52 m/s. Tricuspid Valve: The tricuspid valve is structurally normal. There is normal tricuspid valve leaflet mobility. There is mild tricuspid regurgitation. The Doppler estimated right ventricular systolic pressure (RVSP) is within normal limits at 23 mmHg. Pulmonic Valve: The pulmonic valve is structurally normal. There is mild pulmonic valve regurgitation. Pericardium: No pericardial effusion noted. Aorta: The aortic root is normal. Pulmonary Artery: The main pulmonary artery is normal in size, and position, with normal bifurcation into the left and right pulmonary arteries. Systemic Veins: The inferior vena cava appears normal in size, with IVC inspiratory collapse greater than 50%. In comparison to the previous echocardiogram(s): Compared with study dated 10/10/2022, no significant change.  CONCLUSIONS:  1. The left ventricular systolic function is low normal with a visually estimated ejection fraction of 50-55%.  2. Basal and mid anterolateral wall is abnormal.  3. Abnormal wall motion.  4. Spectral Doppler shows a Grade I (impaired relaxation pattern) of left ventricular diastolic filling with normal left atrial filling pressure.  5. There is normal right ventricular global systolic function.  6. Mildly enlarged right ventricle.  7. There is no evidence of mitral valve stenosis with a doppler estimated mean diastolic gradient of 1 mmHg.  8. Mild mitral valve regurgitation.  9. Mild tricuspid  regurgitation is visualized. 10. The Doppler estimated RVSP is within normal limits at 23 mmHg. 11. Aortic valve stenosis is not present. The peak and mean gradients are 6 mmHg and 4 mmHg respectively. 12. Mild aortic valve regurgitation. 13. The main pulmonary artery is normal in size, and position, with normal bifurcation into the left and right pulmonary arteries. QUANTITATIVE DATA SUMMARY:  2D MEASUREMENTS:             Normal Ranges: Ao Root d:       3.60 cm     (2.0-3.7cm) LAs:             4.70 cm     (2.7-4.0cm) RVIDd:           2.90 cm     (0.9-3.6cm) IVSd:            0.90 cm     (0.6-1.1cm) LVPWd:           0.90 cm     (0.6-1.1cm) LVIDd:           4.80 cm     (3.9-5.9cm) LVIDs:           3.80 cm LV Mass Index:   66.7 g/m2 LVEDV Index:     60.28 ml/m2 LV % FS          20.8 %  LEFT ATRIUM:                 Normal Ranges: LA Area A4C:      22.3 cm2 LA Area A2C:      19.6 cm2 LA Volume Index:  27.9 ml/m2 LA Vol A4C:       65.0 ml LA Vol A2C:       56.0 ml LA Vol Index BSA: 27.3 ml/m2 LA Vol BP:        62.0 ml  RIGHT ATRIUM:                 Normal Ranges: RA Vol A4C:        78.2 ml    (8.3-19.5ml) RA Vol Index A4C:  35.3 ml/m2 RA Area A4C:       22.9 cm2 RA Major Axis A4C: 5.7 cm  AORTA MEASUREMENTS:         Normal Ranges: Asc Ao, d:          3.60 cm (2.1-3.4cm)  LV SYSTOLIC FUNCTION:                      Normal Ranges: EF-A4C View:    50 % (>=55%) EF-A2C View:    51 % EF-Biplane:     52 % EF-Visual:      53 % LV EF Reported: 53 %  LV DIASTOLIC FUNCTION:             Normal Ranges: MV Peak E:             0.52 m/s    (0.7-1.2 m/s) MV Peak A:             0.64 m/s    (0.42-0.7 m/s) E/A Ratio:             0.80        (1.0-2.2) MV e'                  0.053 m/s   (>8.0) MV lateral e'          0.07 m/s MV medial e'           0.04 m/s MV A Dur:              222.00 msec E/e' Ratio:            9.84        (<8.0) PulmV Sys Fortunato:         40.70 cm/s PulmV Evans Fortunato:        38.50 cm/s PulmV S/D Fortunato:         1.10 PulmV A Revs Fortunato:       35.50 cm/s PulmV A Revs Dur:      106.00 msec  MITRAL VALVE:          Normal Ranges: MV Vmax:      0.74 m/s (<=1.3m/s) MV peak P.2 mmHg (<5mmHg) MV mean P.0 mmHg (<48mmHg) MV DT:        250 msec (150-240msec)  AORTIC VALVE:                     Normal Ranges: AoV Vmax:                1.21 m/s (<=1.7m/s) AoV Peak P.9 mmHg (<20mmHg) AoV Mean P.0 mmHg (1.7-11.5mmHg) LVOT Max Fortunato:            0.80 m/s (<=1.1m/s) AoV VTI:                 29.90 cm (18-25cm) LVOT VTI:                20.50 cm LVOT Diameter:           2.10 cm  (1.8-2.4cm) AoV Area, VTI:           2.37 cm2 (2.5-5.5cm2) AoV Area,Vmax:           2.30 cm2 (2.5-4.5cm2) AoV Dimensionless Index: 0.69  RIGHT VENTRICLE: RV Basal 4.50 cm RV Mid   3.50 cm RV Major 7.5 cm TAPSE:   25.5 mm RV s'    0.09 m/s  TRICUSPID VALVE/RVSP:          Normal Ranges: Peak TR Velocity:     2.23 m/s Est. RA Pressure:     3 mmHg RV Syst Pressure:     23 mmHg  (< 30mmHg) IVC Diam:             1.60 cm  PULMONIC VALVE:          Normal Ranges: PV Accel Time:  169 msec (>120ms) PV Max Fortunato:     0.8 m/s  (0.6-0.9m/s) PV Max P.5 mmHg  PULMONARY VEINS: PulmV A Revs Dur: 106.00 msec PulmV A Revs Fortunato: 35.50 cm/s PulmV Evans Fortunato:   38.50 cm/s PulmV S/D Fortunato:    1.10 PulmV Sys Fortunato:    40.70 cm/s  [id number] Carlton Neil DO Electronically signed on 2025 at 3:14:09 PM  Wall Scoring  ** Final **     Nuclear Stress Test  Result Date: 2025  Interpreted By:  Carlton Neil and Arthur Skyler STUDY: MYOCARDIAL PERFUSION STRESS TEST WITH LEXISCAN   Performing facility: [facility], [facility] [address] [place] [address] [facility] Provider:  Carlton Neil DO, [facility] PCP:  Dr. WILMER WONG Supervising provider:  [facility]   INDICATION: CAD; S/P PTCA  AFib;  ICM;  Non-STEMI;   HISTORY: Gender:  M; Age:  79 y/o ; Height:  .8 cm cm; Weight:   .327 kg kg.   High Cholesterol;  CAD;  Diabetes;   HTN;  Arrhythmias; AFib SSS Chest Pain;  PPM;   Denies smoking.   Cardiac catheterization on 2015 LAD 40% RCA 50%.  PTCA on 2015 CX.   COMPARISON: Previous nuclear testing completed on2020 at [facility].     ACCESSION NUMBER(S): [id number]   ORDERING CLINICIAN: PANTERA JONES   TECHNIQUE: ONE DAY protocol. Stress injection: Date:06/17/25, 35.4 mCi of Myoview IV 20 seconds after rapid injection of Lexiscan. Rest injection: Date: 06/17/25, 11.2 mCi of Myoview IV at rest. The patient had a rapid injection of 0.4 mg of Lexiscan IV over 10 seconds. Imaging was performed by gated tomographic technique. Reason for Lexiscan: DJD / PPM   STRESS TEST DATA: Resting heart rate was 61 BPM. Resting blood pressure was 114/72 mmHg. Peak blood pressure was 114/60 mmHg. Peak heart rate was 69 BPM.   TEST TERMINATED DUE TO:  Protocol completed   FINDINGS: STRESS TEST RESULTS:   Resting electrocardiogram revealed sinus rhythm with first-degree AV block. There were no significant ischemic ECG changes or dysrhythmias. The patient did not have chest pains/symptoms during procedure. There was a normal recovery phase.   IMAGING RESULTS:   Image quality was good. Rest and stress tomographic images were reviewed and revealed abnormal perfusion. Moderate area of lateral and inferolateral fixed defect consistent with infarct is present in a moderate amount of segments Minor adjacent ischemia may be present but no independent ischemia seen throughout the remaining myocardium Mild hypokinesis of the lateral wall is noted   LVEF was 48%. TID is 1 and is normal. There was/was not evidence of  attenuation artifact.       Abnormal Lexiscan Myoview cardiac perfusion stress test. No independent  myocardial ischemia by perfusion imaging. Moderate lateral and inferolateral myocardial infarction by perfusion imaging. Slight adjacent ischemia noted Abnormal left ventricular systolic function. Left ventricular ejection fraction 48 %. The above study is  abnormal. Inferolateral lateral defect is present and appears to be fixed with only slight adjacent ischemia. This corresponds to the prior nuclear study done in 2020 and also relates to the prior intervention done on the circumflex artery. Clinical correlation advised.     Signed by: Carlton Neil 6/17/2025 11:50 AM Dictation workstation:   [id number]      Radiology:     No orders to display       Problem List:     Patient Active Problem List   Diagnosis    Anxiety    Arthralgia    Atypical chest pain    Benign essential hypertension    CAD S/P percutaneous coronary angioplasty    Cardiac pacemaker in situ    Chronic peripheral venous hypertension    Depression    DJD (degenerative joint disease)    Fibrillation, atrial (Multi)    Hyperlipidemia, mixed    Ischemic cardiomyopathy    Lesion of vertebra    Lower urinary tract symptoms (LUTS)    Lumbar radiculopathy, chronic    Non-ST elevated myocardial infarction (Multi)    Nontraumatic incomplete tear of right rotator cuff    Osteoarthritis, hand    Phimosis    Pulmonary nodule    Rising PSA level    Shoulder pain    Tinnitus of both ears    Trigger finger    Never smoked cigarettes    Diabetes mellitus (Multi)    Medicare annual wellness visit, subsequent    Class 1 obesity with body mass index (BMI) of 32.0 to 32.9 in adult    Sick sinus syndrome (Multi)       Asessment:       80-year-old gentleman here for routine follow-up and testing results.    Meds, vitals, examination as noted.    Chart review details constipation at length.    Pression:  ASHD class II  Remote PCI and stenting  Hypertension  Atrial fibrillation  Permanent pacemaker  Mild cardiomyopathy unchanged  Sick sinus syndrome  Type 2 diabetes  Mildly overweight  Plan:   Recommendation:  Continue current meds  Normal exercise and activity  See me at 6-month intervals  Call should any other issues or problems arise  Usual follow-up with pacemaker clinic        I, Carline Diego RN  am scribing for,  and in the presence of Dr. Carlton Neil DO .    I, Dr. Carlton Neil DO , personally performed the services described in the documentation as scribed by Carline Diego RN  in my presence, and confirm it is both accurate and complete.    Dr. Carlton Neil DO  Thank  you for allowing me to participate in this patients care, please contact my office with questions.

## 2025-06-26 DIAGNOSIS — E78.2 HYPERLIPIDEMIA, MIXED: ICD-10-CM

## 2025-06-26 DIAGNOSIS — I10 BENIGN ESSENTIAL HYPERTENSION: ICD-10-CM

## 2025-06-26 DIAGNOSIS — E66.811 CLASS 1 OBESITY WITH BODY MASS INDEX (BMI) OF 33.0 TO 33.9 IN ADULT, UNSPECIFIED OBESITY TYPE, UNSPECIFIED WHETHER SERIOUS COMORBIDITY PRESENT: ICD-10-CM

## 2025-06-26 DIAGNOSIS — E08.65 DIABETES MELLITUS DUE TO UNDERLYING CONDITION WITH HYPERGLYCEMIA, WITHOUT LONG-TERM CURRENT USE OF INSULIN: ICD-10-CM

## 2025-07-03 DIAGNOSIS — R07.89 ATYPICAL CHEST PAIN: ICD-10-CM

## 2025-07-03 RX ORDER — ISOSORBIDE MONONITRATE 60 MG/1
60 TABLET, EXTENDED RELEASE ORAL DAILY
Qty: 90 TABLET | Refills: 3 | Status: SHIPPED | OUTPATIENT
Start: 2025-07-03 | End: 2026-07-03

## 2025-07-03 NOTE — TELEPHONE ENCOUNTER
Received request for prescription refills for patient.   Patient follows with Dr. Neil    Request is for Imdur  Is patient currently on medication yes    Last OV 6/24/2025  Next OV 12/23/2025    Pended for Dr. Murphy to sign since Dr. Neil is out of office

## 2025-07-09 ENCOUNTER — HOSPITAL ENCOUNTER (OUTPATIENT)
Dept: CARDIOLOGY | Facility: HOSPITAL | Age: 80
Discharge: HOME | End: 2025-07-09
Payer: MEDICARE

## 2025-07-09 ENCOUNTER — APPOINTMENT (OUTPATIENT)
Dept: CARDIOLOGY | Facility: CLINIC | Age: 80
End: 2025-07-09
Payer: MEDICARE

## 2025-07-09 VITALS
HEART RATE: 63 BPM | BODY MASS INDEX: 32.07 KG/M2 | WEIGHT: 224 LBS | DIASTOLIC BLOOD PRESSURE: 60 MMHG | SYSTOLIC BLOOD PRESSURE: 94 MMHG | HEIGHT: 70 IN

## 2025-07-09 DIAGNOSIS — I25.10 CAD S/P PERCUTANEOUS CORONARY ANGIOPLASTY: ICD-10-CM

## 2025-07-09 DIAGNOSIS — Z98.61 CAD S/P PERCUTANEOUS CORONARY ANGIOPLASTY: ICD-10-CM

## 2025-07-09 DIAGNOSIS — I49.5 SICK SINUS SYNDROME (MULTI): ICD-10-CM

## 2025-07-09 DIAGNOSIS — Z78.9 NEVER SMOKED TOBACCO: ICD-10-CM

## 2025-07-09 DIAGNOSIS — Z95.0 CARDIAC PACEMAKER IN SITU: ICD-10-CM

## 2025-07-09 DIAGNOSIS — I87.309 CHRONIC PERIPHERAL VENOUS HYPERTENSION: ICD-10-CM

## 2025-07-09 DIAGNOSIS — I48.91 ATRIAL FIBRILLATION, UNSPECIFIED TYPE (MULTI): ICD-10-CM

## 2025-07-09 DIAGNOSIS — Z78.9 NEVER SMOKED CIGARETTES: ICD-10-CM

## 2025-07-09 DIAGNOSIS — Z95.0 CARDIAC PACEMAKER IN SITU: Primary | ICD-10-CM

## 2025-07-09 PROCEDURE — 93280 PM DEVICE PROGR EVAL DUAL: CPT

## 2025-07-09 PROCEDURE — 93000 ELECTROCARDIOGRAM COMPLETE: CPT | Mod: DISTINCT PROCEDURAL SERVICE | Performed by: INTERNAL MEDICINE

## 2025-07-09 PROCEDURE — 1159F MED LIST DOCD IN RCRD: CPT | Performed by: INTERNAL MEDICINE

## 2025-07-09 PROCEDURE — 1036F TOBACCO NON-USER: CPT | Performed by: INTERNAL MEDICINE

## 2025-07-09 PROCEDURE — 3078F DIAST BP <80 MM HG: CPT | Performed by: INTERNAL MEDICINE

## 2025-07-09 PROCEDURE — 99214 OFFICE O/P EST MOD 30 MIN: CPT | Performed by: INTERNAL MEDICINE

## 2025-07-09 PROCEDURE — 93280 PM DEVICE PROGR EVAL DUAL: CPT | Performed by: INTERNAL MEDICINE

## 2025-07-09 PROCEDURE — 3074F SYST BP LT 130 MM HG: CPT | Performed by: INTERNAL MEDICINE

## 2025-07-09 ASSESSMENT — ENCOUNTER SYMPTOMS
DYSPNEA ON EXERTION: 0
PALPITATIONS: 0

## 2025-07-09 NOTE — PROGRESS NOTES
CARDIOLOGY OFFICE VISIT      CHIEF COMPLAINT  Chief Complaint   Patient presents with    Follow-up     Patient is present for 6 month follow up with device check for Cardiac pacemaker in situ         HISTORY OF PRESENT ILLNESS  HPI  80-year-old  male who is followed for sinus node dysfunction status post dual-chamber pacemaker implant on June 22, 2018, coronary artery disease status post remote angioplasty, valvular heart disease, hypertension, dyslipidemia with intolerance to statins.     Stress test 06/2025  IMPRESSION:  Abnormal Lexiscan Myoview cardiac perfusion stress test.  No independent  myocardial ischemia by perfusion imaging.  Moderate lateral and inferolateral myocardial infarction by perfusion  imaging. Slight adjacent ischemia noted Abnormal left ventricular  systolic function. Left ventricular ejection fraction 48 %.  The above study is abnormal. Inferolateral lateral defect is present  and appears to be fixed with only slight adjacent ischemia. This  corresponds to the prior nuclear study done in 2020 and also relates  to the prior intervention done on the circumflex artery. Clinical  correlation advised.    Echocardiogram 06/2025    CONCLUSIONS:   1. The left ventricular systolic function is low normal with a visually estimated ejection fraction of 50-55%.   2. Basal and mid anterolateral wall is abnormal.   3. Abnormal wall motion.   4. Spectral Doppler shows a Grade I (impaired relaxation pattern) of left ventricular diastolic filling with normal left atrial filling pressure.   5. There is normal right ventricular global systolic function.   6. Mildly enlarged right ventricle.   7. There is no evidence of mitral valve stenosis with a doppler estimated mean diastolic gradient of 1 mmHg.   8. Mild mitral valve regurgitation.   9. Mild tricuspid regurgitation is visualized.  10. The Doppler estimated RVSP is within normal limits at 23 mmHg.  11. Aortic valve stenosis is not present. The peak  and mean gradients are 6 mmHg and 4 mmHg respectively.  12. Mild aortic valve regurgitation.  13. The main pulmonary artery is normal in size, and position, with normal bifurcation into the left and right pulmonary arteries.    Since the last week's visit he has been doing well.  He denies any symptoms of chest pain or shortness of palpitations.    Patient had a device interrogation today at the device clinic.  He does have a dual-chamber pacemaker [employer] battery longevity 6 years 10 months.  No evidence of atrial or ventricular events noted.    EKG performed today shows sinus rhythm first-degree AV block at a rate of 63 bpm QRS ration 96 ms QT corrected 470 ms.  Rhythm strip shows the same pattern.          Past Medical History  Medical History[1]    Social History  Social History[2]    Family History   Family History[3]     Allergies:  RX Allergies[4]     Outpatient Medications:  Current Outpatient Medications   Medication Instructions    cholecalciferol (Vitamin D3) 50 mcg (2,000 unit) capsule 1 capsule, Daily    clopidogrel (PLAVIX) 75 mg, oral, Daily    isosorbide mononitrate ER (IMDUR) 60 mg, oral, Daily    Jardiance 25 mg Take 1 tablet (25 mg) by mouth once daily.    lancets (OneTouch Delica Plus Lancet) 30 gauge misc use twice daily to check blood sugar    lisinopril 10 mg, oral, 2 times daily    metoprolol tartrate (LOPRESSOR) 25 mg, oral, 2 times daily    multivit-min/ferrous fumarate (MULTI VITAMIN ORAL) 1 tablet, Daily    nitroglycerin (NITROSTAT) 0.4 mg, sublingual, Every 5 min PRN    OneTouch Ultra Test use 2 strips under the skin once daily.    OneTouch Ultra2 Meter misc TEST ONCE A DAY    Repatha SureClick 140 mg/mL injection ADMINISTER 140MG(1 INJECTION) UNDER THE SKIN EVERY 14 DAYS          REVIEW OF SYSTEMS  Review of Systems   Cardiovascular:  Negative for chest pain, dyspnea on exertion and palpitations.   All other systems reviewed and are negative.        VITALS  Vitals:    07/09/25 0949    BP: 94/60   Pulse: 63       PHYSICAL EXAM  Constitutional:       General: Awake.      Appearance: Normal and healthy appearance. Well-developed and not in distress. Obese.   Neck:      Vascular: No JVR. JVD normal.   Pulmonary:      Effort: Pulmonary effort is normal.      Breath sounds: Normal breath sounds. No wheezing. No rhonchi. No rales.   Chest:      Chest wall: Not tender to palpatation.      Comments: Left sided device pocket- healed and well approximated. No swelling or hematoma      Cardiovascular:      PMI at left midclavicular line. Normal rate. Regular rhythm. Normal S1. Normal S2.       Murmurs: There is no murmur.      No gallop.  No click. No rub.   Pulses:     Intact distal pulses.   Edema:     Peripheral edema absent.   Abdominal:      Tenderness: There is no abdominal tenderness.   Musculoskeletal: Normal range of motion.         General: No tenderness. Skin:     General: Skin is warm and dry.   Neurological:      General: No focal deficit present.      Mental Status: Alert and oriented to person, place and time.           ASSESSMENT AND PLAN    Clinical impressions:  1. Sinus node dysfunction status post dual-chamber pacemaker implant ([employer] [place] XT DR MRI) on June 22, 2018.  2. Coronary artery disease status post remote angioplasty with Lexiscan stress test dated June 7, 2018 revealing moderate inferior lateral infarct with no acute ischemic changes.  3. Left ventricular ejection fraction of 50-55% per 2D echo dated October 10, 2022.  4. Valvular heart disease consisting of 1+ MR and TR with mild increase in LV septal wall thickness, mild right ventricular enlargement, mild biatrial enlargement, right ventricular systolic pressure 31 mmHg, within the aortic root at 3.9 cm and ascending aorta at 3.7 cm.  5. Mild biatrial enlargement per 2D echocardiogram.  6. Hypertension, controlled   7. Dyslipidemia on Repatha with history of intolerance of statins.  8. Class II obesity with a BMI  32.39.    Plan recommendation    Patient is doing well from the electrophysiology standpoint continue with current medical therapy that includes beta-blockers.    Follow my office every 6 months or sooner if needed.    Follow device clinic as scheduled    Risk factor modification and lifestyle modification discussed with patient. Diet , exercise and hydration discussed with patient.    I have personally review with patient during this office visit, laboratory data, echocardiogram results, stress test results, Holter-event monitor results prior and after the last electrophysiology visit. All questions has been answered.    Please excuse any errors in grammar or translation related to this dictation.  Voice recognition software was utilized to prepare this document.      I, Dr. Jasmine, personally performed the services described in the documentation as scribed by the nurse in my presence, and confirm it is both accurate and complete.              [1]   Past Medical History:  Diagnosis Date    Encounter for immunization 11/24/2021    Encounter for immunization    Ingrowing nail 03/03/2022    Ingrown toenail    Obesity, unspecified 05/02/2022    Class 2 obesity with body mass index (BMI) of 36.0 to 36.9 in adult    Obesity, unspecified 10/17/2022    Class 2 obesity with body mass index (BMI) of 37.0 to 37.9 in adult    Obesity, unspecified 07/27/2022    Class 2 obesity with body mass index (BMI) of 35.0 to 35.9 in adult    Obesity, unspecified 04/12/2022    Class 2 obesity with body mass index (BMI) of 35.0 to 35.9 in adult    Other acute postprocedural pain 05/09/2022    Post-op pain    Other conditions influencing health status 11/14/2020    Postoperative bleeding from mouth    Personal history of other diseases of the digestive system 11/14/2020    History of oral hemorrhage    Personal history of other diseases of the musculoskeletal system and connective tissue 06/15/2022    History of neck pain    Personal history  of other specified conditions 05/02/2022    History of fatigue    Personal history of other specified conditions 05/02/2022    History of abdominal pain    Stiffness of right hand, not elsewhere classified 07/06/2022    Stiffness of finger joint of right hand   [2]   Social History  Tobacco Use    Smoking status: Never     Passive exposure: Never    Smokeless tobacco: Never   Vaping Use    Vaping status: Never Used   Substance Use Topics    Alcohol use: Yes     Comment: 3 beers weekly    Drug use: Never   [3]   Family History  Problem Relation Name Age of Onset    Arthritis Mother      Hypertension Father      Stroke Father      Hypertension Brother      Esophageal cancer Son      Leukemia Mother's Sister     [4]   Allergies  Allergen Reactions    Metformin Other    Statins-Hmg-Coa Reductase Inhibitors Other    Glimepiride Other, Diarrhea and Palpitations

## 2025-07-09 NOTE — PATIENT INSTRUCTIONS
Continue same medications/treatment.  Patient educated on proper medication use.  Patient educated on risk factor modification.  Please bring any lab results from other providers/physicians to your next appointment.    Please bring all medicines, vitamins, and herbal supplements with you when you come to the office.    Prescriptions will not be filled unless you are compliant with your follow up appointments or have a follow up appointment scheduled as per instruction of your physician. Refills should be requested at the time of your visit.    Follow up with our physician assistant, Pooja, in 6 months with device check  Continue remote checks at 3 and 9 months    Shannan GARCIA RN, AM SCRIBING FOR, AND IN THE PRESENCE OF DR. MIKALA LOPEZ MD

## 2025-07-24 LAB
ALBUMIN SERPL-MCNC: 4.3 G/DL (ref 3.6–5.1)
ALBUMIN/CREAT UR: 3 MG/G CREAT
ALP SERPL-CCNC: 49 U/L (ref 35–144)
ALT SERPL-CCNC: 13 U/L (ref 9–46)
ANION GAP SERPL CALCULATED.4IONS-SCNC: 8 MMOL/L (CALC) (ref 7–17)
AST SERPL-CCNC: 15 U/L (ref 10–35)
BILIRUB SERPL-MCNC: 0.9 MG/DL (ref 0.2–1.2)
BUN SERPL-MCNC: 16 MG/DL (ref 7–25)
CALCIUM SERPL-MCNC: 9.5 MG/DL (ref 8.6–10.3)
CHLORIDE SERPL-SCNC: 103 MMOL/L (ref 98–110)
CO2 SERPL-SCNC: 27 MMOL/L (ref 20–32)
CREAT SERPL-MCNC: 0.78 MG/DL (ref 0.7–1.28)
CREAT UR-MCNC: 79 MG/DL (ref 20–320)
EGFRCR SERPLBLD CKD-EPI 2021: 91 ML/MIN/1.73M2
EST. AVERAGE GLUCOSE BLD GHB EST-MCNC: 146 MG/DL
EST. AVERAGE GLUCOSE BLD GHB EST-SCNC: 8.1 MMOL/L
GLUCOSE SERPL-MCNC: 105 MG/DL (ref 65–99)
HBA1C MFR BLD: 6.7 %
MICROALBUMIN UR-MCNC: 0.2 MG/DL
POTASSIUM SERPL-SCNC: 4.6 MMOL/L (ref 3.5–5.3)
PROT SERPL-MCNC: 7.4 G/DL (ref 6.1–8.1)
SODIUM SERPL-SCNC: 138 MMOL/L (ref 135–146)

## 2025-07-28 ENCOUNTER — APPOINTMENT (OUTPATIENT)
Dept: PRIMARY CARE | Facility: CLINIC | Age: 80
End: 2025-07-28
Payer: MEDICARE

## 2025-07-28 VITALS
TEMPERATURE: 97.3 F | SYSTOLIC BLOOD PRESSURE: 99 MMHG | BODY MASS INDEX: 32.64 KG/M2 | WEIGHT: 228 LBS | HEART RATE: 62 BPM | DIASTOLIC BLOOD PRESSURE: 64 MMHG | HEIGHT: 70 IN

## 2025-07-28 DIAGNOSIS — G47.9 SLEEP DISORDER: ICD-10-CM

## 2025-07-28 DIAGNOSIS — Z12.5 PROSTATE CANCER SCREENING: ICD-10-CM

## 2025-07-28 DIAGNOSIS — Z78.9 NEVER SMOKED CIGARETTES: ICD-10-CM

## 2025-07-28 DIAGNOSIS — E11.65 TYPE 2 DIABETES MELLITUS WITH HYPERGLYCEMIA, WITHOUT LONG-TERM CURRENT USE OF INSULIN: ICD-10-CM

## 2025-07-28 DIAGNOSIS — E66.811 CLASS 1 OBESITY WITHOUT SERIOUS COMORBIDITY WITH BODY MASS INDEX (BMI) OF 32.0 TO 32.9 IN ADULT, UNSPECIFIED OBESITY TYPE: ICD-10-CM

## 2025-07-28 DIAGNOSIS — E78.2 HYPERLIPIDEMIA, MIXED: ICD-10-CM

## 2025-07-28 DIAGNOSIS — I10 BENIGN ESSENTIAL HYPERTENSION: ICD-10-CM

## 2025-07-28 PROCEDURE — 1160F RVW MEDS BY RX/DR IN RCRD: CPT | Performed by: FAMILY MEDICINE

## 2025-07-28 PROCEDURE — 99213 OFFICE O/P EST LOW 20 MIN: CPT | Performed by: FAMILY MEDICINE

## 2025-07-28 PROCEDURE — 1036F TOBACCO NON-USER: CPT | Performed by: FAMILY MEDICINE

## 2025-07-28 PROCEDURE — 3074F SYST BP LT 130 MM HG: CPT | Performed by: FAMILY MEDICINE

## 2025-07-28 PROCEDURE — 3078F DIAST BP <80 MM HG: CPT | Performed by: FAMILY MEDICINE

## 2025-07-28 PROCEDURE — 1159F MED LIST DOCD IN RCRD: CPT | Performed by: FAMILY MEDICINE

## 2025-07-28 RX ORDER — ASPIRIN 81 MG/1
81 TABLET ORAL DAILY
COMMUNITY

## 2025-07-28 ASSESSMENT — PATIENT HEALTH QUESTIONNAIRE - PHQ9
1. LITTLE INTEREST OR PLEASURE IN DOING THINGS: NOT AT ALL
SUM OF ALL RESPONSES TO PHQ9 QUESTIONS 1 AND 2: 0
2. FEELING DOWN, DEPRESSED OR HOPELESS: NOT AT ALL

## 2025-07-28 ASSESSMENT — ENCOUNTER SYMPTOMS
HEMATOLOGIC/LYMPHATIC NEGATIVE: 1
CARDIOVASCULAR NEGATIVE: 1
GASTROINTESTINAL NEGATIVE: 1
ALLERGIC/IMMUNOLOGIC NEGATIVE: 1
EYES NEGATIVE: 1
SLEEP DISTURBANCE: 1
MUSCULOSKELETAL NEGATIVE: 1
RESPIRATORY NEGATIVE: 1
ENDOCRINE NEGATIVE: 1
CONSTITUTIONAL NEGATIVE: 1

## 2025-07-28 NOTE — PROGRESS NOTES
Sumit Corral is here for a follow-up on his diabetes hyperlipidemia and hypertension.  He states that overall he has been doing well.  He does note some difficulty sleeping.  This has worsened since his wife passed away 18 months ago.  He is able to fall asleep for 4 to 5 hours but then awakens to urinate.  He occasionally has a harder time than going back to sleep.  He otherwise has no complaints.  He sees Dr. Cody Patel for cardiology care and Dr. Jasmine for care of his pacemaker.  He has been trying to follow his diabetic diet.  He continues on his meds noted    Patient ID: Juan Loza is a 80 y.o. male who presents for Follow-up (Ears ringing more often and louder sort of a click, muscle pain in general, not sleeping well at night since his wife passed away, discuss prostate bw):    Problem List Items Addressed This Visit    None     Medical History[1]   Surgical History[2]   Family History[3]   Social History     Socioeconomic History    Marital status:      Spouse name: Not on file    Number of children: Not on file    Years of education: Not on file    Highest education level: Not on file   Occupational History    Not on file   Tobacco Use    Smoking status: Never     Passive exposure: Never    Smokeless tobacco: Never   Vaping Use    Vaping status: Never Used   Substance and Sexual Activity    Alcohol use: Yes     Comment: seldomly    Drug use: Never    Sexual activity: Defer   Other Topics Concern    Not on file   Social History Narrative    Not on file     Social Drivers of Health     Financial Resource Strain: Not on file   Food Insecurity: Not on file   Transportation Needs: Not on file   Physical Activity: Not on file   Stress: Not on file   Social Connections: Not on file   Intimate Partner Violence: Not on file   Housing Stability: Not on file      Metformin, Statins-hmg-coa reductase inhibitors, and Glimepiride   Current Medications[4]    Immunization History   Administered Date(s)  Administered    Flu vaccine, trivalent, preservative free, HIGH-DOSE, age 65y+ (Fluzone) 10/12/2015, 10/24/2017, 11/16/2018, 10/01/2019, 09/16/2020, 10/19/2021, 09/28/2022, 10/21/2024    Flu vaccine, trivalent, preservative free, age 6 months and greater (Fluarix/Fluzone/Flulaval) 11/26/2012    Influenza, Unspecified 10/01/2014, 11/01/2015, 09/16/2016    Influenza, seasonal, injectable 09/23/2014    Moderna SARS-CoV-2 Vaccination 02/17/2021, 03/17/2021, 10/25/2021    Pneumococcal conjugate vaccine, 13-valent (PREVNAR 13) 11/30/2015, 10/28/2016    Pneumococcal polysaccharide vaccine, 23-valent, age 2 years and older (PNEUMOVAX 23) 03/21/2014, 11/17/2015    Zoster, Unspecified 08/31/2020, 11/16/2020    Zoster, live 01/01/2013        Review of Systems   Constitutional: Negative.    HENT: Negative.     Eyes: Negative.    Respiratory: Negative.     Cardiovascular: Negative.    Gastrointestinal: Negative.    Endocrine: Negative.    Genitourinary: Negative.    Musculoskeletal: Negative.    Skin: Negative.    Allergic/Immunologic: Negative.    Hematological: Negative.    Psychiatric/Behavioral:  Positive for sleep disturbance.    All other systems reviewed and are negative.       Vitals:    07/28/25 0917   BP: 99/64   Pulse: 62   Temp: 36.3 °C (97.3 °F)     Vitals:    07/28/25 0917   Weight: 103 kg (228 lb)      Physical Exam  Constitutional:       General: He is not in acute distress.     Appearance: Normal appearance.     Cardiovascular:      Rate and Rhythm: Normal rate and regular rhythm.      Pulses: Normal pulses.      Heart sounds: Normal heart sounds. No murmur heard.     No friction rub. No gallop.   Pulmonary:      Effort: Pulmonary effort is normal. No respiratory distress.      Breath sounds: Normal breath sounds. No wheezing or rales.     Neurological:      General: No focal deficit present.      Mental Status: He is alert and oriented to person, place, and time. Mental status is at baseline.           ASSESSMENT/PLAN: Diabetes mellitus type 2 stable with A1c 6.7..  Continue Jardiance daily and continue to follow diet closely.  Eye examinations are up-to-date.  Check CMP and A1c in 4 months    Hyperlipidemia managed by cardiology on Repatha.  Check lipid profile in 4 months    Hypertension stable.  Continue lisinopril and metoprolol as noted    History of coronary artery disease followed by cardiology  History of pacemaker followed by electrophysiology    Sleep disorder.  We discussed sleep hygiene in detail.  We also discussed the role of nocturia for his sleeping difficulties.    Hearing loss and tinnitus.  [facility] regarding this.    Follow-up 4 months and call as needed       Scribe Attestation  By signing my name below, I, Amee Manning LPN, Scribe   attest that this documentation has been prepared under the direction and in the presence of Donnie Farnsworth MD.         [1]   Past Medical History:  Diagnosis Date    Encounter for immunization 11/24/2021    Encounter for immunization    Ingrowing nail 03/03/2022    Ingrown toenail    Obesity, unspecified 05/02/2022    Class 2 obesity with body mass index (BMI) of 36.0 to 36.9 in adult    Obesity, unspecified 10/17/2022    Class 2 obesity with body mass index (BMI) of 37.0 to 37.9 in adult    Obesity, unspecified 07/27/2022    Class 2 obesity with body mass index (BMI) of 35.0 to 35.9 in adult    Obesity, unspecified 04/12/2022    Class 2 obesity with body mass index (BMI) of 35.0 to 35.9 in adult    Other acute postprocedural pain 05/09/2022    Post-op pain    Other conditions influencing health status 11/14/2020    Postoperative bleeding from mouth    Personal history of other diseases of the digestive system 11/14/2020    History of oral hemorrhage    Personal history of other diseases of the musculoskeletal system and connective tissue 06/15/2022    History of neck pain    Personal history of other specified conditions  05/02/2022    History of fatigue    Personal history of other specified conditions 05/02/2022    History of abdominal pain    Stiffness of right hand, not elsewhere classified 07/06/2022    Stiffness of finger joint of right hand   [2]   Past Surgical History:  Procedure Laterality Date    OTHER SURGICAL HISTORY  11/24/2021    Hip replacement    OTHER SURGICAL HISTORY  11/24/2021    Wrist surgery    OTHER SURGICAL HISTORY  11/24/2021    Percutaneous transluminal coronary angioplasty    OTHER SURGICAL HISTORY  11/24/2021    Pacemaker insertion    OTHER SURGICAL HISTORY  11/24/2021    Appendectomy    OTHER SURGICAL HISTORY  11/24/2021    Back surgery    OTHER SURGICAL HISTORY  11/24/2021    Esophagogastroduodenoscopy    OTHER SURGICAL HISTORY  11/24/2021    Tonsillectomy    OTHER SURGICAL HISTORY  11/24/2021    Varicose vein sclerotherapy    OTHER SURGICAL HISTORY  12/09/2021    Complete colonoscopy    OTHER SURGICAL HISTORY  12/15/2021    Cataract surgery    OTHER SURGICAL HISTORY  06/29/2022    Circumcision   [3]   Family History  Problem Relation Name Age of Onset    Arthritis Mother      Hypertension Father      Stroke Father      Hypertension Brother      Esophageal cancer Son      Leukemia Mother's Sister     [4]   Current Outpatient Medications   Medication Sig Dispense Refill    aspirin 81 mg EC tablet Take 1 tablet (81 mg) by mouth once daily.      cholecalciferol (Vitamin D3) 50 mcg (2,000 unit) capsule Take 1 capsule (50 mcg) by mouth once daily.      clopidogrel (Plavix) 75 mg tablet TAKE ONE TABLET BY MOUTH EVERY DAY 90 tablet 1    isosorbide mononitrate ER (Imdur) 60 mg 24 hr tablet Take 1 tablet (60 mg) by mouth once daily. 90 tablet 3    Jardiance 25 mg Take 1 tablet (25 mg) by mouth once daily.      lancets (OneTouch Delica Plus Lancet) 30 gauge misc use twice daily to check blood sugar 200 each 3    lisinopril 10 mg tablet Take 1 tablet (10 mg) by mouth 2 times a day. 180 tablet 3    metoprolol  tartrate (Lopressor) 25 mg tablet TAKE ONE TABLET BY MOUTH TWO TIMES A  tablet 3    multivit-min/ferrous fumarate (MULTI VITAMIN ORAL) Take 1 tablet by mouth once daily.      nitroglycerin (Nitrostat) 0.4 mg SL tablet Place 1 tablet (0.4 mg) under the tongue every 5 minutes if needed for chest pain (FOR UP TO 3 DOSES AS NEEDED FOR CHEST PAIN.). 25 tablet 5    OneTouch Ultra Test use 2 strips under the skin once daily. 200 strip 3    OneTouch Ultra2 Meter misc TEST ONCE A DAY      Repatha SureClick 140 mg/mL injection ADMINISTER 140MG(1 INJECTION) UNDER THE SKIN EVERY 14 DAYS 6 mL 3     No current facility-administered medications for this visit.

## 2025-08-23 ENCOUNTER — OFFICE VISIT (OUTPATIENT)
Dept: URGENT CARE | Age: 80
End: 2025-08-23
Payer: MEDICARE

## 2025-08-23 VITALS
RESPIRATION RATE: 17 BRPM | DIASTOLIC BLOOD PRESSURE: 74 MMHG | HEART RATE: 74 BPM | SYSTOLIC BLOOD PRESSURE: 121 MMHG | TEMPERATURE: 97 F | OXYGEN SATURATION: 97 %

## 2025-08-23 DIAGNOSIS — L24.7 IRRITANT CONTACT DERMATITIS DUE TO PLANTS, EXCEPT FOOD: Primary | ICD-10-CM

## 2025-08-23 PROCEDURE — 1036F TOBACCO NON-USER: CPT

## 2025-08-23 PROCEDURE — 99203 OFFICE O/P NEW LOW 30 MIN: CPT

## 2025-08-23 PROCEDURE — 3074F SYST BP LT 130 MM HG: CPT

## 2025-08-23 PROCEDURE — 1159F MED LIST DOCD IN RCRD: CPT

## 2025-08-23 PROCEDURE — 3078F DIAST BP <80 MM HG: CPT

## 2025-08-23 PROCEDURE — 1160F RVW MEDS BY RX/DR IN RCRD: CPT

## 2025-08-23 RX ORDER — METHYLPREDNISOLONE 4 MG/1
TABLET ORAL
Qty: 21 TABLET | Refills: 0 | Status: SHIPPED | OUTPATIENT
Start: 2025-08-23 | End: 2025-08-29

## 2025-11-05 ENCOUNTER — APPOINTMENT (OUTPATIENT)
Dept: PRIMARY CARE | Facility: CLINIC | Age: 80
End: 2025-11-05
Payer: MEDICARE

## 2025-12-23 ENCOUNTER — APPOINTMENT (OUTPATIENT)
Dept: CARDIOLOGY | Facility: CLINIC | Age: 80
End: 2025-12-23
Payer: MEDICARE

## 2026-01-09 ENCOUNTER — APPOINTMENT (OUTPATIENT)
Dept: CARDIOLOGY | Facility: CLINIC | Age: 81
End: 2026-01-09
Payer: MEDICARE